# Patient Record
Sex: FEMALE | Race: WHITE | URBAN - METROPOLITAN AREA
[De-identification: names, ages, dates, MRNs, and addresses within clinical notes are randomized per-mention and may not be internally consistent; named-entity substitution may affect disease eponyms.]

---

## 2024-09-10 ENCOUNTER — APPOINTMENT (OUTPATIENT)
Dept: OBSTETRICS AND GYNECOLOGY | Facility: CLINIC | Age: 29
End: 2024-09-10

## 2024-09-10 VITALS
DIASTOLIC BLOOD PRESSURE: 78 MMHG | BODY MASS INDEX: 24.49 KG/M2 | SYSTOLIC BLOOD PRESSURE: 122 MMHG | WEIGHT: 147 LBS | HEIGHT: 65 IN

## 2024-09-10 DIAGNOSIS — Z98.891 HISTORY OF CESAREAN SECTION: ICD-10-CM

## 2024-09-10 DIAGNOSIS — N89.8 VAGINAL IRRITATION: ICD-10-CM

## 2024-09-10 DIAGNOSIS — Z3A.01 LESS THAN 8 WEEKS GESTATION OF PREGNANCY (HHS-HCC): Primary | ICD-10-CM

## 2024-09-10 PROBLEM — E03.9 HYPOTHYROIDISM: Status: ACTIVE | Noted: 2024-09-10

## 2024-09-10 PROCEDURE — 87205 SMEAR GRAM STAIN: CPT

## 2024-09-10 PROCEDURE — 0500F INITIAL PRENATAL CARE VISIT: CPT | Performed by: OBSTETRICS & GYNECOLOGY

## 2024-09-10 RX ORDER — LEVOTHYROXINE SODIUM 50 UG/1
50 TABLET ORAL DAILY
COMMUNITY

## 2024-09-10 ASSESSMENT — EDINBURGH POSTNATAL DEPRESSION SCALE (EPDS)
THE THOUGHT OF HARMING MYSELF HAS OCCURRED TO ME: NEVER
TOTAL SCORE: 4
I HAVE BEEN SO UNHAPPY THAT I HAVE BEEN CRYING: NO, NEVER
THINGS HAVE BEEN GETTING ON TOP OF ME: NO, MOST OF THE TIME I HAVE COPED QUITE WELL
I HAVE BEEN SO UNHAPPY THAT I HAVE HAD DIFFICULTY SLEEPING: NOT AT ALL
I HAVE BEEN ANXIOUS OR WORRIED FOR NO GOOD REASON: HARDLY EVER
I HAVE FELT SAD OR MISERABLE: NO, NOT AT ALL
I HAVE FELT SCARED OR PANICKY FOR NO GOOD REASON: NO, NOT MUCH
I HAVE BEEN ABLE TO LAUGH AND SEE THE FUNNY SIDE OF THINGS: AS MUCH AS I ALWAYS COULD
I HAVE BLAMED MYSELF UNNECESSARILY WHEN THINGS WENT WRONG: NOT VERY OFTEN
I HAVE LOOKED FORWARD WITH ENJOYMENT TO THINGS: AS MUCH AS I EVER DID

## 2024-09-10 NOTE — PROGRESS NOTES
Patient presents for initial OB visit    LMP: 2024  EPDS = 4  C/O: had ureaplasma few months ago,thinks that it is back and wants checked. Hypothyroid.     Tess Alexandra MA II    Initial prenatal visit     29 y.o.  at 4.5 WGA weeks gestational age by sure LMP.  Planned and desired pregnancy.  Still nursing 1.5 year old but periods have been back for a while and have been regular.   Will do USN at next visit.  PMH complicated by hypothyroidism - on synthroid 50mcg daily.  Will recheck TSH with routine labs at next visit.   OB history significant for prior term c/s for breech at 39 weeks.  It was in [place] so unable to get operative report.  It was complicated by a post-op hematoma that was discovered on POD #2 - ultimately the bleeding stopped and she never had a repeat surgery but did receive multiple units of blood.  There is no reason to suspect an incision other than a low transverse.  Pt motivated to have TOLAC.  MFMU 72.7%.  c/o mild fatigue and some breast tenderness. Taking OTC PNV.   Recommended flu vaccine - pt declines.  Recommended updated COVID vaccine. BMI today Body mass index is 24.46 kg/m². Discussed optimal weight gain in pregnancy.  Discussed genetic screening options and carrier screening.  She will think about these.  Not candidate for ASA prophylaxis.  C/o vaginal odor.  Not having noticeable discharge.  Vaginitis swab sent.     Medical Problems       Problem List       Less than 8 weeks gestation of pregnancy ([facility])    Overview Signed 9/10/2024  3:45 PM by Myriam Churchill MD     Desired provider in labor: [x] CNM  [] Physician  [x] Blood Products: [x] Yes, accepts [] No, needs counseling  [x] Initial BMI: 24.76   [] Prenatal Labs:   [] Cervical Cancer Screening up to date  [] Rh status:   [] Genetic Screening:    [] NT US: (11-13 wks)  [] Pregnancy dated by:      [] Anatomy US: (19-20 wks)  [] Federal Sterilization consent signed (if indicated):  [] 1hr GCT at 24-28wks:  []  Rhogam (if indicated):   [] Fetal Surveillance (if indicated):  [] Tdap (27-32 wks, may be given up to 36 wks if initial window missed):   [] RSV (32-36 wks) (Sept. to end ):   [] Flu Vaccine:     [] Breastfeeding:  [] Postpartum Birth control method:   [] GBS at 36 - 37 wks:  [] 39 weeks discussion of IOL vs. Expectant management:  [] Mode of delivery ( anticipated ):           History of  section    Overview Addendum 9/10/2024  3:56 PM by Myriam Churchill MD     - c/s for breech at 39 weeks in [place] - unable to get operative report but no reason to suspect incision other than low transverse   - MFMU 72.7%  - Planning TOLAC          Hypothyroidism    Overview Signed 9/10/2024  3:57 PM by Myriam Churchill MD     - on synthroid 50mcg daily   <> TSH q trimester              Follow up in 4 week(s).

## 2024-09-14 LAB
CLUE CELLS VAG LPF-#/AREA: NORMAL /[LPF]
NUGENT SCORE: 0
YEAST VAG WET PREP-#/AREA: NORMAL

## 2024-09-18 ENCOUNTER — TELEPHONE (OUTPATIENT)
Dept: OBSTETRICS AND GYNECOLOGY | Facility: CLINIC | Age: 29
End: 2024-09-18
Payer: COMMERCIAL

## 2024-09-18 NOTE — TELEPHONE ENCOUNTER
5.6 wk ob called ob line stating that she had bright red bleeding, more than spotting on Saturday night, by Sunday she was only having brown spotting.  Now she is only noticing a grayish tinge to her vaginal discharge when wiping.  Denies cramping or pain.  Patient states she did end up calling the on call Dr over the weekend but didn't go in to the ED for evaluation.    Patient assured that spotting/light bleeding with or without mild cramping is not uncommon in the first part of pregnancy.  This can be from recent intercourse, constipation or even from implantation.  For now, patient encouraged to monitor her spotting/bleeding for now.  She should call office back with heavy bleeding, where she needs to change her pad every 1-2 hours with intense cramping or pain.    Advised to keep next obfu as scheduled on 10/2.  She was assured that I will make Dr Churchill aware of her call.

## 2024-10-02 ENCOUNTER — APPOINTMENT (OUTPATIENT)
Dept: OBSTETRICS AND GYNECOLOGY | Facility: CLINIC | Age: 29
End: 2024-10-02
Payer: COMMERCIAL

## 2024-10-02 VITALS — SYSTOLIC BLOOD PRESSURE: 122 MMHG | WEIGHT: 148.6 LBS | BODY MASS INDEX: 24.73 KG/M2 | DIASTOLIC BLOOD PRESSURE: 71 MMHG

## 2024-10-02 DIAGNOSIS — O21.9 NAUSEA AND VOMITING IN PREGNANCY PRIOR TO 22 WEEKS GESTATION: ICD-10-CM

## 2024-10-02 DIAGNOSIS — E03.9 HYPOTHYROIDISM, UNSPECIFIED TYPE: ICD-10-CM

## 2024-10-02 DIAGNOSIS — Z34.90 PRENATAL CARE, ANTEPARTUM (HHS-HCC): ICD-10-CM

## 2024-10-02 DIAGNOSIS — Z3A.01 LESS THAN 8 WEEKS GESTATION OF PREGNANCY (HHS-HCC): Primary | ICD-10-CM

## 2024-10-02 DIAGNOSIS — Z98.891 HISTORY OF CESAREAN SECTION: ICD-10-CM

## 2024-10-02 PROCEDURE — 0501F PRENATAL FLOW SHEET: CPT | Performed by: OBSTETRICS & GYNECOLOGY

## 2024-10-02 PROCEDURE — 87086 URINE CULTURE/COLONY COUNT: CPT

## 2024-10-02 PROCEDURE — 87491 CHLMYD TRACH DNA AMP PROBE: CPT

## 2024-10-02 PROCEDURE — 87591 N.GONORRHOEAE DNA AMP PROB: CPT

## 2024-10-02 RX ORDER — ONDANSETRON 4 MG/1
4 TABLET, FILM COATED ORAL EVERY 8 HOURS PRN
Qty: 20 TABLET | Refills: 1 | Status: SHIPPED | OUTPATIENT
Start: 2024-10-02 | End: 2024-11-01

## 2024-10-02 NOTE — PROGRESS NOTES
Patient presents for OBFU  Pap, GC/CT & urine culture sent today  Flu: Declines   C/O: nervous, had bleeding on     Tess Alexandra MA II    Routine prenatal visit   Pt worried because she had 1 day of bright red bleeding that turned to spotting.  Has not happened again since.  USN done today - tejeda IUP seen with CR[facility] c/w LMP dating.  Cardiac activity noted.  Pt would like carrier screening - sent today with routine labs.  Declines NIPS.  13 week USN discussed and she will call to schedule.  Declines flu/COVID vaccination.   Repeat TSH sent as well.  Has had some nausea- would like zofran prescription to have on-hand as needed.  Prescription sent.     Medical Problems       Problem List       Less than 8 weeks gestation of pregnancy ([facility])    Overview Addendum 10/2/2024 11:28 AM by Myriam Churchill MD     Desired provider in labor: [x] CNM  [] Physician  [x] Blood Products: [x] Yes, accepts [] No, needs counseling  [x] Initial BMI: 24.76   [] Prenatal Labs:   [] Cervical Cancer Screening up to date<> record release from [facility] signed   [] Rh status:   [] Genetic Screening:  Declines   [] NT US: (11-13 wks)  [x] Pregnancy dated by:  LMP c/w 7 week in-office USN     [] Anatomy US: (19-20 wks)  [] Federal Sterilization consent signed (if indicated):  [] 1hr GCT at 24-28wks:  [] Rhogam (if indicated):   [] Fetal Surveillance (if indicated):  [] Tdap (27-32 wks, may be given up to 36 wks if initial window missed):   [] RSV (32-36 wks) (Sept. to end of ):   [x] Flu Vaccine: Declines   [x] Updated COVID vaccine recommended     [] Breastfeeding:  [] Postpartum Birth control method:   [] GBS at 36 - 37 wks:  [] 39 weeks discussion of IOL vs. Expectant management:  [] Mode of delivery ( anticipated ):           History of  section    Overview Addendum 10/2/2024 11:29 AM by Myriam Churchill MD     - c/s for breech at 39 weeks in [place] - unable to get operative report but no reason to suspect  incision other than low transverse   - MFMU 72.7%  - Planning TOLAC   <> give pt TOLAC counseling sheet to review          Hypothyroidism    Overview Signed 9/10/2024  3:57 PM by Myriam Churchill MD     - on synthroid 50mcg daily   <> TSH q trimester              Follow up in 4 week(s).

## 2024-10-03 LAB
BACTERIA UR CULT: NORMAL
C TRACH RRNA SPEC QL NAA+PROBE: NEGATIVE
N GONORRHOEA DNA SPEC QL PROBE+SIG AMP: NEGATIVE

## 2024-10-21 ENCOUNTER — LAB (OUTPATIENT)
Dept: LAB | Facility: LAB | Age: 29
End: 2024-10-21
Payer: COMMERCIAL

## 2024-10-21 DIAGNOSIS — Z34.90 PRENATAL CARE, ANTEPARTUM (HHS-HCC): ICD-10-CM

## 2024-10-21 LAB
ABO GROUP (TYPE) IN BLOOD: NORMAL
ANTIBODY SCREEN: NORMAL
ERYTHROCYTE [DISTWIDTH] IN BLOOD BY AUTOMATED COUNT: 12.8 % (ref 11.5–14.5)
EST. AVERAGE GLUCOSE BLD GHB EST-MCNC: 91 MG/DL
HBA1C MFR BLD: 4.8 %
HBV SURFACE AG SERPL QL IA: NONREACTIVE
HCT VFR BLD AUTO: 41.7 % (ref 36–46)
HCV AB SER QL: NONREACTIVE
HGB BLD-MCNC: 13.3 G/DL (ref 12–16)
HIV 1+2 AB+HIV1 P24 AG SERPL QL IA: NONREACTIVE
MCH RBC QN AUTO: 27.3 PG (ref 26–34)
MCHC RBC AUTO-ENTMCNC: 31.9 G/DL (ref 32–36)
MCV RBC AUTO: 86 FL (ref 80–100)
NRBC BLD-RTO: 0 /100 WBCS (ref 0–0)
PLATELET # BLD AUTO: 150 X10*3/UL (ref 150–450)
RBC # BLD AUTO: 4.88 X10*6/UL (ref 4–5.2)
REFLEX ADDED, ANEMIA PANEL: NORMAL
RH FACTOR (ANTIGEN D): NORMAL
RUBV IGG SERPL IA-ACNC: 1.5 IA
RUBV IGG SERPL QL IA: POSITIVE
TREPONEMA PALLIDUM IGG+IGM AB [PRESENCE] IN SERUM OR PLASMA BY IMMUNOASSAY: NONREACTIVE
TSH SERPL-ACNC: 2.12 MIU/L (ref 0.44–3.98)
WBC # BLD AUTO: 7.4 X10*3/UL (ref 4.4–11.3)

## 2024-10-21 PROCEDURE — 86901 BLOOD TYPING SEROLOGIC RH(D): CPT

## 2024-10-21 PROCEDURE — G0452 MOLECULAR PATHOLOGY INTERPR: HCPCS | Performed by: PATHOLOGY

## 2024-10-21 PROCEDURE — 86900 BLOOD TYPING SEROLOGIC ABO: CPT

## 2024-10-21 PROCEDURE — 86780 TREPONEMA PALLIDUM: CPT

## 2024-10-21 PROCEDURE — 86803 HEPATITIS C AB TEST: CPT

## 2024-10-21 PROCEDURE — 81220 CFTR GENE COM VARIANTS: CPT

## 2024-10-21 PROCEDURE — 87340 HEPATITIS B SURFACE AG IA: CPT

## 2024-10-21 PROCEDURE — 81329 SMN1 GENE DOS/DELETION ALYS: CPT

## 2024-10-21 PROCEDURE — 86850 RBC ANTIBODY SCREEN: CPT

## 2024-10-21 PROCEDURE — 86317 IMMUNOASSAY INFECTIOUS AGENT: CPT

## 2024-10-21 PROCEDURE — 36415 COLL VENOUS BLD VENIPUNCTURE: CPT

## 2024-10-21 PROCEDURE — 83036 HEMOGLOBIN GLYCOSYLATED A1C: CPT

## 2024-10-21 PROCEDURE — 85027 COMPLETE CBC AUTOMATED: CPT

## 2024-10-21 PROCEDURE — 84443 ASSAY THYROID STIM HORMONE: CPT

## 2024-10-21 PROCEDURE — 81243 FMR1 GEN ALY DETC ABNL ALLEL: CPT

## 2024-10-21 PROCEDURE — 87389 HIV-1 AG W/HIV-1&-2 AB AG IA: CPT

## 2024-10-24 LAB
ELECTRONICALLY SIGNED BY: NORMAL
SMA RESULT: NORMAL

## 2024-10-25 LAB
CFTR MUT ANL BLD/T: NORMAL
ELECTRONICALLY SIGNED BY: NORMAL

## 2024-10-29 LAB
ELECTRONICALLY SIGNED BY: NORMAL
FRAGILE X INTERPRETATION: NORMAL
FRAGILE X RESULT: NORMAL

## 2024-11-06 ENCOUNTER — HOSPITAL ENCOUNTER (OUTPATIENT)
Dept: RADIOLOGY | Facility: CLINIC | Age: 29
Discharge: HOME | End: 2024-11-06
Payer: COMMERCIAL

## 2024-11-06 DIAGNOSIS — O26.891 OTHER SPECIFIED PREGNANCY RELATED CONDITIONS, FIRST TRIMESTER (HHS-HCC): ICD-10-CM

## 2024-11-06 DIAGNOSIS — Z34.90 PRENATAL CARE, ANTEPARTUM (HHS-HCC): ICD-10-CM

## 2024-11-06 PROCEDURE — 76801 OB US < 14 WKS SINGLE FETUS: CPT | Performed by: OBSTETRICS & GYNECOLOGY

## 2024-11-06 PROCEDURE — 76801 OB US < 14 WKS SINGLE FETUS: CPT

## 2024-11-07 ENCOUNTER — APPOINTMENT (OUTPATIENT)
Dept: OBSTETRICS AND GYNECOLOGY | Facility: CLINIC | Age: 29
End: 2024-11-07
Payer: COMMERCIAL

## 2024-11-07 VITALS — BODY MASS INDEX: 25.63 KG/M2 | WEIGHT: 154 LBS | SYSTOLIC BLOOD PRESSURE: 118 MMHG | DIASTOLIC BLOOD PRESSURE: 64 MMHG

## 2024-11-07 DIAGNOSIS — Z3A.13 13 WEEKS GESTATION OF PREGNANCY (HHS-HCC): Primary | ICD-10-CM

## 2024-11-07 PROCEDURE — 0501F PRENATAL FLOW SHEET: CPT | Performed by: OBSTETRICS & GYNECOLOGY

## 2024-11-07 NOTE — PROGRESS NOTES
Doing well. No complaints.   FNT wnl . OB labs wnl.   We discussed genetic screening in pregnancy.  Patient agrees and blood work drawn for cfDNA   Lorraine Pastor MD

## 2024-12-02 ENCOUNTER — APPOINTMENT (OUTPATIENT)
Dept: OBSTETRICS AND GYNECOLOGY | Facility: CLINIC | Age: 29
End: 2024-12-02
Payer: COMMERCIAL

## 2024-12-02 VITALS — SYSTOLIC BLOOD PRESSURE: 122 MMHG | WEIGHT: 160.6 LBS | BODY MASS INDEX: 26.73 KG/M2 | DIASTOLIC BLOOD PRESSURE: 72 MMHG

## 2024-12-02 DIAGNOSIS — Z3A.26 26 WEEKS GESTATION OF PREGNANCY (HHS-HCC): Primary | ICD-10-CM

## 2024-12-02 PROBLEM — Z3A.16 16 WEEKS GESTATION OF PREGNANCY (HHS-HCC): Status: ACTIVE | Noted: 2024-09-10

## 2024-12-02 PROCEDURE — 0501F PRENATAL FLOW SHEET: CPT | Performed by: OBSTETRICS & GYNECOLOGY

## 2024-12-19 ENCOUNTER — HOSPITAL ENCOUNTER (OUTPATIENT)
Dept: RADIOLOGY | Facility: CLINIC | Age: 29
Discharge: HOME | End: 2024-12-19
Payer: COMMERCIAL

## 2024-12-19 ENCOUNTER — APPOINTMENT (OUTPATIENT)
Dept: RADIOLOGY | Facility: CLINIC | Age: 29
End: 2024-12-19
Payer: COMMERCIAL

## 2024-12-19 DIAGNOSIS — Z34.90 PRENATAL CARE, ANTEPARTUM (HHS-HCC): ICD-10-CM

## 2024-12-19 PROCEDURE — 76817 TRANSVAGINAL US OBSTETRIC: CPT

## 2024-12-19 PROCEDURE — 76811 OB US DETAILED SNGL FETUS: CPT

## 2024-12-21 PROBLEM — O44.40 LOW-LYING PLACENTA (HHS-HCC): Status: ACTIVE | Noted: 2024-12-21

## 2024-12-31 ENCOUNTER — APPOINTMENT (OUTPATIENT)
Dept: OBSTETRICS AND GYNECOLOGY | Facility: CLINIC | Age: 29
End: 2024-12-31
Payer: COMMERCIAL

## 2024-12-31 VITALS — SYSTOLIC BLOOD PRESSURE: 129 MMHG | WEIGHT: 170 LBS | BODY MASS INDEX: 28.29 KG/M2 | DIASTOLIC BLOOD PRESSURE: 83 MMHG

## 2024-12-31 DIAGNOSIS — Z98.891 HISTORY OF CESAREAN SECTION: ICD-10-CM

## 2024-12-31 DIAGNOSIS — Z3A.20 20 WEEKS GESTATION OF PREGNANCY (HHS-HCC): Primary | ICD-10-CM

## 2024-12-31 PROCEDURE — 0501F PRENATAL FLOW SHEET: CPT | Performed by: OBSTETRICS & GYNECOLOGY

## 2024-12-31 NOTE — PROGRESS NOTES
Patient presents for OBFU  Glucose and 28 week folder given or n/v. Patient understands that she is to NOT drink the glucose if she is rescheduled to a different location.  C/O: might be traveling to [place] to see friend with stage 4 cancer. Travel/Chemo questions. Letter for airline. Pain on incision sight.     Tess Alexandra MA II    Routine prenatal visit     Subjective    HPI:  Doing well overall. Feeling normal FM now.  Discussed risks of TOLAC and given information sheet for this. Planning to travel to [place] to see a friend who is dying.  Will try to do glucola by 28 weeks depending on when she gets home.    Objective    Vital Signs  /83   Wt 77.1 kg (170 lb)   LMP 2024 (Exact Date)   BMI 28.29 kg/m²     Melissa German is a 29 y.o. yo  at 20w5d here for the following concerns which we addressed today:     Medical Problems       Problem List       20 weeks gestation of pregnancy ([facility])    Overview Addendum 2024 11:36 AM by Myriam Churchill MD     Desired provider in labor: [x] CNM  [] Physician  [x] Blood Products: [x] Yes, accepts [] No, needs counseling  [x] Initial BMI: 24.76   [x] Prenatal Labs:   [x] Cervical Cancer Screening up to date: 2024 - WNL (records scanned in)  [x] Rh status: O pos  [x] Genetic Screening:  RR NIPS, it's a BOY   [x] NT US: (11-13 wks): WNL  [x] Pregnancy dated by:  LMP c/w 7 week in-office USN     [x] Anatomy US: (19-20 wks): WNL other than low-lying placenta   [] Federal Sterilization consent signed (if indicated):  [] 1hr GCT at 24-28wks:  [] Fetal Surveillance (if indicated):  [] Tdap (27-32 wks, may be given up to 36 wks if initial window missed):   [] RSV (32-36 wks) (Sept. to end of ):   [x] Flu Vaccine: Declines   [x] Updated COVID vaccine recommended     [] Breastfeeding:  [] Postpartum Birth control method:   [] GBS at 36 - 37 wks:  [] 39 weeks discussion of IOL vs. Expectant management:  [] Mode of delivery ( anticipated ):            History of  section    Overview Addendum 2024  2:06 PM by Myriam Churchill MD     - c/s for breech at 39 weeks in [place] - unable to get operative report but no reason to suspect incision other than low transverse   - MFMU 72.7%  - Planning TOLAC            Hypothyroidism    Overview Signed 9/10/2024  3:57 PM by Myriam Churchill MD     - on synthroid 50mcg daily   <> TSH q trimester          Low-lying placenta (HHS-HCC)    Overview Signed 2024  6:27 AM by Myriam Churchill MD     - 1.2 cm from os on anatomy USN   <> f/u USN 30 weeks              Follow up in 4 week(s).

## 2025-01-28 ENCOUNTER — APPOINTMENT (OUTPATIENT)
Dept: OBSTETRICS AND GYNECOLOGY | Facility: CLINIC | Age: 30
End: 2025-01-28
Payer: COMMERCIAL

## 2025-02-14 ENCOUNTER — APPOINTMENT (OUTPATIENT)
Dept: OBSTETRICS AND GYNECOLOGY | Facility: CLINIC | Age: 30
End: 2025-02-14
Payer: COMMERCIAL

## 2025-02-25 ENCOUNTER — APPOINTMENT (OUTPATIENT)
Dept: OBSTETRICS AND GYNECOLOGY | Facility: CLINIC | Age: 30
End: 2025-02-25
Payer: COMMERCIAL

## 2025-02-25 VITALS — SYSTOLIC BLOOD PRESSURE: 122 MMHG | BODY MASS INDEX: 30.35 KG/M2 | WEIGHT: 182.4 LBS | DIASTOLIC BLOOD PRESSURE: 77 MMHG

## 2025-02-25 DIAGNOSIS — E03.9 HYPOTHYROIDISM, UNSPECIFIED TYPE: ICD-10-CM

## 2025-02-25 DIAGNOSIS — Z13.1 SCREENING FOR DIABETES MELLITUS (DM): ICD-10-CM

## 2025-02-25 DIAGNOSIS — Z3A.28 28 WEEKS GESTATION OF PREGNANCY (HHS-HCC): Primary | ICD-10-CM

## 2025-02-25 DIAGNOSIS — O44.40 LOW-LYING PLACENTA (HHS-HCC): ICD-10-CM

## 2025-02-25 DIAGNOSIS — Z98.891 HISTORY OF CESAREAN SECTION: ICD-10-CM

## 2025-02-25 LAB
ERYTHROCYTE [DISTWIDTH] IN BLOOD BY AUTOMATED COUNT: 13.1 % (ref 11.5–14.5)
HCT VFR BLD AUTO: 38.9 % (ref 36–46)
HGB BLD-MCNC: 12.7 G/DL (ref 12–16)
MCH RBC QN AUTO: 28.9 PG (ref 26–34)
MCHC RBC AUTO-ENTMCNC: 32.6 G/DL (ref 32–36)
MCV RBC AUTO: 88 FL (ref 80–100)
NRBC BLD-RTO: 0 /100 WBCS (ref 0–0)
PLATELET # BLD AUTO: 98 X10*3/UL (ref 150–450)
RBC # BLD AUTO: 4.4 X10*6/UL (ref 4–5.2)
WBC # BLD AUTO: 9 X10*3/UL (ref 4.4–11.3)

## 2025-02-25 PROCEDURE — 85027 COMPLETE CBC AUTOMATED: CPT

## 2025-02-25 PROCEDURE — 0501F PRENATAL FLOW SHEET: CPT | Performed by: OBSTETRICS & GYNECOLOGY

## 2025-02-25 ASSESSMENT — EDINBURGH POSTNATAL DEPRESSION SCALE (EPDS)
THE THOUGHT OF HARMING MYSELF HAS OCCURRED TO ME: NEVER
I HAVE BEEN SO UNHAPPY THAT I HAVE BEEN CRYING: ONLY OCCASIONALLY
THINGS HAVE BEEN GETTING ON TOP OF ME: NO, MOST OF THE TIME I HAVE COPED QUITE WELL
TOTAL SCORE: 11
I HAVE BEEN ANXIOUS OR WORRIED FOR NO GOOD REASON: YES, SOMETIMES
I HAVE FELT SAD OR MISERABLE: YES, QUITE OFTEN
I HAVE BLAMED MYSELF UNNECESSARILY WHEN THINGS WENT WRONG: YES, SOME OF THE TIME
I HAVE BEEN SO UNHAPPY THAT I HAVE HAD DIFFICULTY SLEEPING: NOT VERY OFTEN
I HAVE LOOKED FORWARD WITH ENJOYMENT TO THINGS: RATHER LESS THAN I USED TO
I HAVE BEEN ABLE TO LAUGH AND SEE THE FUNNY SIDE OF THINGS: AS MUCH AS I ALWAYS COULD
I HAVE FELT SCARED OR PANICKY FOR NO GOOD REASON: NO, NOT MUCH

## 2025-02-25 NOTE — PROGRESS NOTES
Patient presents for OBFU  GTT/CBC/Syph/TSH today  EPDS = 11  Tdap: Discuss   C/O: None    Tess Alexandra MA II    Routine prenatal visit     Subjective    HPI:  No complaints today - feeling well overall.  Glucola/CBC/syphilis screen done today.  Discussed Tdap.  Pt unsure.  Given [facility] patient information sheet - will ask again next visit.     Objective    Vital Signs  /77   Wt 82.7 kg (182 lb 6.4 oz)   LMP 2024 (Exact Date)   BMI 30.35 kg/m²     Melissa German is a 29 y.o. yo  at 28w6d here for the following concerns which we addressed today:     Medical Problems       Problem List       28 weeks gestation of pregnancy ([facility])    Overview Addendum 2025 10:51 AM by Myriam Churchill MD     Desired provider in labor: [x] CNM  [] Physician  [x] Blood Products: [x] Yes, accepts [] No, needs counseling  [x] Initial BMI: 24.76   [x] Prenatal Labs:   [x] Cervical Cancer Screening up to date: 2024 - WNL (records scanned in)  [x] Rh status: O pos  [x] Genetic Screening:  RR NIPS, it's a BOY   [x] NT US: (11-13 wks): WNL  [x] Pregnancy dated by:  LMP c/w 7 week in-office USN     [x] Anatomy US: (19-20 wks): WNL other than low-lying placenta   [] Federal Sterilization consent signed (if indicated):  [] 1hr GCT at 24-28wks: <> done    [] Fetal Surveillance (if indicated):  [] Tdap: <> wants to think about - discuss again next visit   [x] Flu Vaccine: Declines   [x] Updated COVID vaccine recommended     [] Breastfeeding:  [] Postpartum Birth control method:   [] GBS at 36 - 37 wks:  [] 39 weeks discussion of IOL vs. Expectant management:  [] Mode of delivery ( anticipated ):           History of  section    Overview Addendum 2024  2:06 PM by Myriam Churchill MD     - c/s for breech at 39 weeks in [place] - unable to get operative report but no reason to suspect incision other than low transverse   - MFMU 72.7%  - Planning TOLAC            Hypothyroidism    Overview Signed  9/10/2024  3:57 PM by Myriam Churchill MD     - on synthroid 50mcg daily   <> TSH q trimester          Low-lying placenta (HHS-HCC)    Overview Addendum 2/26/2025 10:52 AM by Myriam Churchill MD     - 1.2 cm from os on anatomy USN   <> f/u USN 30 weeks scheduled 3/6              Follow up in 2 week(s).

## 2025-02-26 LAB — REFLEX ADDED, ANEMIA PANEL: NORMAL

## 2025-02-27 LAB — TSH SERPL-ACNC: 1.49 MIU/L

## 2025-03-03 ENCOUNTER — TELEPHONE (OUTPATIENT)
Dept: OBSTETRICS AND GYNECOLOGY | Facility: CLINIC | Age: 30
End: 2025-03-03
Payer: COMMERCIAL

## 2025-03-03 NOTE — TELEPHONE ENCOUNTER
Patient called the OB line and spoke with me. Patient is 29w 4d. Patient was calling in regards to her recent blood work results. I advised her that only half of her results were back and we're still waiting on the other half. Once the other results are back, one of us will call her or send her a My Chart message.    Patient had her GTT/CBC drawn 2/25/2025. Still pending results.

## 2025-03-05 PROBLEM — D69.6 THROMBOCYTOPENIA (CMS-HCC): Status: ACTIVE | Noted: 2025-03-05

## 2025-03-06 ENCOUNTER — HOSPITAL ENCOUNTER (OUTPATIENT)
Dept: RADIOLOGY | Facility: CLINIC | Age: 30
Discharge: HOME | End: 2025-03-06
Payer: COMMERCIAL

## 2025-03-06 DIAGNOSIS — Z34.90 PRENATAL CARE, ANTEPARTUM (HHS-HCC): ICD-10-CM

## 2025-03-06 PROCEDURE — 76817 TRANSVAGINAL US OBSTETRIC: CPT

## 2025-03-06 PROCEDURE — 76816 OB US FOLLOW-UP PER FETUS: CPT

## 2025-03-11 ENCOUNTER — APPOINTMENT (OUTPATIENT)
Dept: OBSTETRICS AND GYNECOLOGY | Facility: CLINIC | Age: 30
End: 2025-03-11
Payer: COMMERCIAL

## 2025-03-11 VITALS — SYSTOLIC BLOOD PRESSURE: 117 MMHG | BODY MASS INDEX: 29.85 KG/M2 | DIASTOLIC BLOOD PRESSURE: 77 MMHG | WEIGHT: 179.4 LBS

## 2025-03-11 DIAGNOSIS — O44.40 LOW-LYING PLACENTA (HHS-HCC): ICD-10-CM

## 2025-03-11 DIAGNOSIS — D69.6 THROMBOCYTOPENIA (CMS-HCC): ICD-10-CM

## 2025-03-11 DIAGNOSIS — Z3A.30 30 WEEKS GESTATION OF PREGNANCY (HHS-HCC): ICD-10-CM

## 2025-03-11 DIAGNOSIS — Z98.891 HISTORY OF CESAREAN SECTION: ICD-10-CM

## 2025-03-11 DIAGNOSIS — Z13.1 SCREENING FOR DIABETES MELLITUS (DM): ICD-10-CM

## 2025-03-11 DIAGNOSIS — D69.6 THROMBOCYTOPENIA AFFECTING PREGNANCY (MULTI): Primary | ICD-10-CM

## 2025-03-11 DIAGNOSIS — E03.9 HYPOTHYROIDISM, UNSPECIFIED TYPE: ICD-10-CM

## 2025-03-11 DIAGNOSIS — O99.119 THROMBOCYTOPENIA AFFECTING PREGNANCY (MULTI): Primary | ICD-10-CM

## 2025-03-11 PROCEDURE — 0501F PRENATAL FLOW SHEET: CPT | Performed by: OBSTETRICS & GYNECOLOGY

## 2025-03-11 NOTE — PROGRESS NOTES
Patient presents for OBFU  Tdap: Declines   C/O: BH's, pressure & fatigue. Hands are discolored x 2 days.     Tess Alexandra MA II    Routine prenatal visit     Subjective    HPI:  Feeling OK overall.  Discussed Tdap - pt declines.  She is concerned about vaccines because her  had a reaction after getting a vaccine when he was a baby (his brother also had a similar reaction) that led him to be hospitalized.  He is unclear what vaccine it was or what the reaction was and hs parents are no longer living, so he is unable to get more information about this.  Due to this, she is unsure what vaccines she plans to get for her child.     Reviewed labs from last visit - her glucola and syphilis screen did not result - unclear why -will reach out to [facility] to discuss.  She did have a CBC which showed thrombocytopenia with platelets 98.  Will repeat CBC. TSH appropriate -continue current dose of synthroid.     Objective    Vital Signs  /77   Wt 81.4 kg (179 lb 6.4 oz)   LMP 2024 (Exact Date)   BMI 29.85 kg/m²     Melissa German is a 29 y.o. yo  at 30w6d here for the following concerns which we addressed today:     Medical Problems       Problem List       30 weeks gestation of pregnancy ([facility])    Overview Addendum 3/12/2025  3:58 PM by Myriam Churchill MD     Desired provider in labor: [x] CNM  [] Physician  [x] Blood Products: [x] Yes, accepts [] No, needs counseling  [x] Initial BMI: 24.76   [x] Prenatal Labs:   [x] Cervical Cancer Screening up to date: 2024 - WNL (records scanned in)  [x] Rh status: O pos  [x] Genetic Screening:  RR NIPS, it's a BOY   [x] NT US: (11-13 wks): WNL  [x] Pregnancy dated by:  LMP c/w 7 week in-office USN     [x] Anatomy US: (19-20 wks): WNL other than low-lying placenta   [] Federal Sterilization consent signed (if indicated):  [] 1hr GCT at 24-28wks: <> done  --> no results available   [] Fetal Surveillance (if indicated):  [x] Tdap: Declines   [x] Flu  Vaccine: Declines   [x] Updated COVID vaccine recommended     [] Breastfeeding:  [] Postpartum Birth control method:   [] GBS at 36 - 37 wks:  [] 39 weeks discussion of IOL vs. Expectant management:  [] Mode of delivery ( anticipated ):           History of  section    Overview Addendum 2024  2:06 PM by Myriam Churchill MD     - c/s for breech at 39 weeks in [place] - unable to get operative report but no reason to suspect incision other than low transverse   - MFMU 72.7%  - Planning TOLAC            Hypothyroidism    Overview Signed 9/10/2024  3:57 PM by Myriam Churchill MD     - on synthroid 50mcg daily   <> TSH q trimester          Low-lying placenta (HHS-HCC)    Overview Addendum 3/7/2025 12:13 PM by Myriam Churchill MD     - 1.2 cm from os on anatomy USN   --> RESOLVED at 30 week USN            Thrombocytopenia (CMS-HCC)    Overview Signed 3/5/2025  4:03 PM by Myriam Churchill MD     - platelets 98 with glucola   <> repeat CBC              Follow up in 2 week(s).

## 2025-03-12 DIAGNOSIS — Z13.1 SCREENING FOR DIABETES MELLITUS (DM): ICD-10-CM

## 2025-03-12 PROBLEM — Z3A.30 30 WEEKS GESTATION OF PREGNANCY (HHS-HCC): Status: ACTIVE | Noted: 2024-09-10

## 2025-03-25 ENCOUNTER — APPOINTMENT (OUTPATIENT)
Dept: OBSTETRICS AND GYNECOLOGY | Facility: CLINIC | Age: 30
End: 2025-03-25
Payer: COMMERCIAL

## 2025-03-25 VITALS — WEIGHT: 182.6 LBS | BODY MASS INDEX: 30.39 KG/M2 | SYSTOLIC BLOOD PRESSURE: 125 MMHG | DIASTOLIC BLOOD PRESSURE: 79 MMHG

## 2025-03-25 DIAGNOSIS — O44.40 LOW-LYING PLACENTA (HHS-HCC): ICD-10-CM

## 2025-03-25 DIAGNOSIS — E03.9 HYPOTHYROIDISM, UNSPECIFIED TYPE: ICD-10-CM

## 2025-03-25 DIAGNOSIS — Z98.891 HISTORY OF CESAREAN SECTION: ICD-10-CM

## 2025-03-25 DIAGNOSIS — Z3A.32 32 WEEKS GESTATION OF PREGNANCY (HHS-HCC): Primary | ICD-10-CM

## 2025-03-25 DIAGNOSIS — Z13.1 SCREENING FOR DIABETES MELLITUS (DM): ICD-10-CM

## 2025-03-25 DIAGNOSIS — O23.43 URINARY TRACT INFECTION IN MOTHER DURING THIRD TRIMESTER OF PREGNANCY (HHS-HCC): ICD-10-CM

## 2025-03-25 DIAGNOSIS — D69.6 THROMBOCYTOPENIA (CMS-HCC): ICD-10-CM

## 2025-03-25 LAB
POC APPEARANCE, URINE: ABNORMAL
POC BILIRUBIN, URINE: NEGATIVE
POC BLOOD, URINE: ABNORMAL
POC COLOR, URINE: ABNORMAL
POC GLUCOSE, URINE: NEGATIVE MG/DL
POC KETONES, URINE: NEGATIVE MG/DL
POC LEUKOCYTES, URINE: ABNORMAL
POC NITRITE,URINE: NEGATIVE
POC PH, URINE: 6.5 PH
POC PROTEIN, URINE: NEGATIVE MG/DL
POC SPECIFIC GRAVITY, URINE: 1.01
POC UROBILINOGEN, URINE: 0.2 EU/DL

## 2025-03-25 PROCEDURE — 0501F PRENATAL FLOW SHEET: CPT | Performed by: OBSTETRICS & GYNECOLOGY

## 2025-03-25 PROCEDURE — 81003 URINALYSIS AUTO W/O SCOPE: CPT | Performed by: OBSTETRICS & GYNECOLOGY

## 2025-03-25 NOTE — PROGRESS NOTES
"Patient presents for OBFU  CBC/Glucose & Syph today  C/O: Can not hear out of right ear. UTI?     Tess Alexandra MA II    Routine prenatal visit     Subjective    HPI:  Pt did glucola again today as it was not run by [facility] when she drank it last time.  Also sent syphilis screen and repeated CBC given platelets of 98.  She has ordered her breast pump.  She feels occasional \"twinges\" of pain at her urethra and wonders whether she may have a UTI.  UA in office trace leukocyte esterase and blood -urine culture sent - will treat based on results.      Objective    Vital Signs  /79   Wt 82.8 kg (182 lb 9.6 oz)   LMP 2024 (Exact Date)   BMI 30.39 kg/m²     Melissa German is a 29 y.o. yo  at 32w5d here for the following concerns which we addressed today:     Medical Problems       Problem List       32 weeks gestation of pregnancy ([facility])    Overview Addendum 3/25/2025 11:58 AM by Myriam Churchill MD     Desired provider in labor: [x] CNM  [] Physician  [x] Blood Products: [x] Yes, accepts [] No, needs counseling  [x] Initial BMI: 24.76   [x] Prenatal Labs:   [x] Cervical Cancer Screening up to date: 2024 - WNL (records scanned in)  [x] Rh status: O pos  [x] Genetic Screening:  RR NIPS, it's a BOY   [x] NT US: (11-13 wks): WNL  [x] Pregnancy dated by:  LMP c/w 7 week in-office USN     [x] Anatomy US: (19-20 wks): WNL other than low-lying placenta   [] Federal Sterilization consent signed (if indicated):  [] 1hr GCT at 24-28wks: <> repeat glucola collected today as was not run by lab at last draw   [] Fetal Surveillance (if indicated):  [x] Tdap: Declines   [x] Flu Vaccine: Declines   [x] Updated COVID vaccine recommended     [x] Breastfeeding: plans to breast feed - has pump ordered   [] Postpartum Birth control method:   [] GBS at 36 - 37 wks:  [] 39 weeks discussion of IOL vs. Expectant management:  [] Mode of delivery ( anticipated ): TOLAC            History of  section    Overview " Addendum 12/31/2024  2:06 PM by Myriam Churchill MD     - c/s for breech at 39 weeks in [place] - unable to get operative report but no reason to suspect incision other than low transverse   - MFMU 72.7%  - Planning TOLAC            Hypothyroidism    Overview Addendum 3/25/2025 11:58 AM by Myriam Churchill MD     - on synthroid 50mcg daily   <> TSH approx 34 weeks          Low-lying placenta (HHS-HCC)    Overview Addendum 3/7/2025 12:13 PM by Myriam Churchill MD     - 1.2 cm from os on anatomy USN   --> RESOLVED at 30 week USN            Thrombocytopenia (CMS-HCC)    Overview Addendum 3/25/2025 11:58 AM by Myriam Churchill MD     - platelets 98 with glucola   <> repeat CBC sent 3/25              Follow up in 2 week(s).

## 2025-03-26 DIAGNOSIS — Z3A.33 33 WEEKS GESTATION OF PREGNANCY (HHS-HCC): ICD-10-CM

## 2025-03-26 DIAGNOSIS — D69.6 THROMBOCYTOPENIA (CMS-HCC): ICD-10-CM

## 2025-03-28 LAB
BACTERIA UR CULT: ABNORMAL
ERYTHROCYTE [DISTWIDTH] IN BLOOD BY AUTOMATED COUNT: 11.8 % (ref 11–15)
GLUCOSE 1H P 50 G GLC PO SERPL-MCNC: 100 MG/DL
HCT VFR BLD AUTO: 41.3 % (ref 35–45)
HGB BLD-MCNC: 13.6 G/DL (ref 11.7–15.5)
MCH RBC QN AUTO: 28 PG (ref 27–33)
MCHC RBC AUTO-ENTMCNC: 32.9 G/DL (ref 32–36)
MCV RBC AUTO: 85.2 FL (ref 80–100)
PLATELET # BLD AUTO: 93 THOUSAND/UL (ref 140–400)
PMV BLD REES-ECKER: 12.4 FL (ref 7.5–12.5)
RBC # BLD AUTO: 4.85 MILLION/UL (ref 3.8–5.1)
T PALLIDUM AB SER QL IA: NORMAL
WBC # BLD AUTO: 8.9 THOUSAND/UL (ref 3.8–10.8)

## 2025-03-31 LAB
BACTERIA UR CULT: ABNORMAL
ERYTHROCYTE [DISTWIDTH] IN BLOOD BY AUTOMATED COUNT: 11.8 % (ref 11–15)
GLUCOSE 1H P 50 G GLC PO SERPL-MCNC: 100 MG/DL
HCT VFR BLD AUTO: 41.3 % (ref 35–45)
HGB BLD-MCNC: 13.6 G/DL (ref 11.7–15.5)
MCH RBC QN AUTO: 28 PG (ref 27–33)
MCHC RBC AUTO-ENTMCNC: 32.9 G/DL (ref 32–36)
MCV RBC AUTO: 85.2 FL (ref 80–100)
PLATELET # BLD AUTO: 93 THOUSAND/UL (ref 140–400)
PMV BLD REES-ECKER: 12.4 FL (ref 7.5–12.5)
RBC # BLD AUTO: 4.85 MILLION/UL (ref 3.8–5.1)
T PALLIDUM AB SER QL IA: NEGATIVE
WBC # BLD AUTO: 8.9 THOUSAND/UL (ref 3.8–10.8)

## 2025-04-02 ENCOUNTER — HOSPITAL ENCOUNTER (OUTPATIENT)
Facility: HOSPITAL | Age: 30
Discharge: HOME | End: 2025-04-02
Attending: OBSTETRICS & GYNECOLOGY | Admitting: OBSTETRICS & GYNECOLOGY
Payer: COMMERCIAL

## 2025-04-02 ENCOUNTER — HOSPITAL ENCOUNTER (OUTPATIENT)
Facility: HOSPITAL | Age: 30
End: 2025-04-02
Attending: OBSTETRICS & GYNECOLOGY | Admitting: OBSTETRICS & GYNECOLOGY
Payer: COMMERCIAL

## 2025-04-02 VITALS
HEART RATE: 85 BPM | HEIGHT: 65 IN | OXYGEN SATURATION: 98 % | TEMPERATURE: 97.9 F | BODY MASS INDEX: 30.85 KG/M2 | SYSTOLIC BLOOD PRESSURE: 114 MMHG | DIASTOLIC BLOOD PRESSURE: 59 MMHG | WEIGHT: 185.19 LBS | RESPIRATION RATE: 17 BRPM

## 2025-04-02 PROCEDURE — 59025 FETAL NON-STRESS TEST: CPT

## 2025-04-02 PROCEDURE — 99213 OFFICE O/P EST LOW 20 MIN: CPT | Mod: 25

## 2025-04-02 PROCEDURE — 99214 OFFICE O/P EST MOD 30 MIN: CPT

## 2025-04-02 RX ORDER — ONDANSETRON 4 MG/1
4 TABLET, FILM COATED ORAL EVERY 6 HOURS PRN
Status: DISCONTINUED | OUTPATIENT
Start: 2025-04-02 | End: 2025-04-03 | Stop reason: HOSPADM

## 2025-04-02 RX ORDER — AMOXICILLIN AND CLAVULANATE POTASSIUM 875; 125 MG/1; MG/1
875 TABLET, FILM COATED ORAL 2 TIMES DAILY
COMMUNITY

## 2025-04-02 RX ORDER — LABETALOL HYDROCHLORIDE 5 MG/ML
20 INJECTION, SOLUTION INTRAVENOUS ONCE AS NEEDED
Status: DISCONTINUED | OUTPATIENT
Start: 2025-04-02 | End: 2025-04-03 | Stop reason: HOSPADM

## 2025-04-02 RX ORDER — ONDANSETRON HYDROCHLORIDE 2 MG/ML
4 INJECTION, SOLUTION INTRAVENOUS EVERY 6 HOURS PRN
Status: DISCONTINUED | OUTPATIENT
Start: 2025-04-02 | End: 2025-04-03 | Stop reason: HOSPADM

## 2025-04-02 RX ORDER — HYDRALAZINE HYDROCHLORIDE 20 MG/ML
5 INJECTION INTRAMUSCULAR; INTRAVENOUS ONCE AS NEEDED
Status: DISCONTINUED | OUTPATIENT
Start: 2025-04-02 | End: 2025-04-03 | Stop reason: HOSPADM

## 2025-04-02 RX ORDER — LIDOCAINE HYDROCHLORIDE 10 MG/ML
0.5 INJECTION, SOLUTION EPIDURAL; INFILTRATION; INTRACAUDAL; PERINEURAL ONCE AS NEEDED
Status: DISCONTINUED | OUTPATIENT
Start: 2025-04-02 | End: 2025-04-03 | Stop reason: HOSPADM

## 2025-04-02 SDOH — SOCIAL STABILITY: SOCIAL INSECURITY: DOES ANYONE TRY TO KEEP YOU FROM HAVING/CONTACTING OTHER FRIENDS OR DOING THINGS OUTSIDE YOUR HOME?: NO

## 2025-04-02 SDOH — HEALTH STABILITY: MENTAL HEALTH: WISH TO BE DEAD (PAST 1 MONTH): NO

## 2025-04-02 SDOH — ECONOMIC STABILITY: HOUSING INSECURITY: DO YOU FEEL UNSAFE GOING BACK TO THE PLACE WHERE YOU ARE LIVING?: NO

## 2025-04-02 SDOH — ECONOMIC STABILITY: FOOD INSECURITY: WITHIN THE PAST 12 MONTHS, THE FOOD YOU BOUGHT JUST DIDN'T LAST AND YOU DIDN'T HAVE MONEY TO GET MORE.: NEVER TRUE

## 2025-04-02 SDOH — SOCIAL STABILITY: SOCIAL INSECURITY: HAVE YOU HAD THOUGHTS OF HARMING ANYONE ELSE?: NO

## 2025-04-02 SDOH — SOCIAL STABILITY: SOCIAL INSECURITY: PHYSICAL ABUSE: DENIES

## 2025-04-02 SDOH — SOCIAL STABILITY: SOCIAL INSECURITY: VERBAL ABUSE: DENIES

## 2025-04-02 SDOH — SOCIAL STABILITY: SOCIAL INSECURITY
WITHIN THE LAST YEAR, HAVE YOU BEEN KICKED, HIT, SLAPPED, OR OTHERWISE PHYSICALLY HURT BY YOUR PARTNER OR EX-PARTNER?: NO

## 2025-04-02 SDOH — SOCIAL STABILITY: SOCIAL INSECURITY: WITHIN THE LAST YEAR, HAVE YOU BEEN HUMILIATED OR EMOTIONALLY ABUSED IN OTHER WAYS BY YOUR PARTNER OR EX-PARTNER?: NO

## 2025-04-02 SDOH — SOCIAL STABILITY: SOCIAL INSECURITY: HAVE YOU HAD ANY THOUGHTS OF HARMING ANYONE ELSE?: NO

## 2025-04-02 SDOH — SOCIAL STABILITY: SOCIAL INSECURITY: DO YOU FEEL ANYONE HAS EXPLOITED OR TAKEN ADVANTAGE OF YOU FINANCIALLY OR OF YOUR PERSONAL PROPERTY?: NO

## 2025-04-02 SDOH — SOCIAL STABILITY: SOCIAL INSECURITY
WITHIN THE LAST YEAR, HAVE YOU BEEN RAPED OR FORCED TO HAVE ANY KIND OF SEXUAL ACTIVITY BY YOUR PARTNER OR EX-PARTNER?: NO

## 2025-04-02 SDOH — SOCIAL STABILITY: SOCIAL INSECURITY: WITHIN THE LAST YEAR, HAVE YOU BEEN AFRAID OF YOUR PARTNER OR EX-PARTNER?: NO

## 2025-04-02 SDOH — ECONOMIC STABILITY: FOOD INSECURITY: WITHIN THE PAST 12 MONTHS, YOU WORRIED THAT YOUR FOOD WOULD RUN OUT BEFORE YOU GOT THE MONEY TO BUY MORE.: NEVER TRUE

## 2025-04-02 SDOH — HEALTH STABILITY: MENTAL HEALTH: NON-SPECIFIC ACTIVE SUICIDAL THOUGHTS (PAST 1 MONTH): NO

## 2025-04-02 SDOH — SOCIAL STABILITY: SOCIAL INSECURITY: ABUSE SCREEN: ADULT

## 2025-04-02 SDOH — SOCIAL STABILITY: SOCIAL INSECURITY: HAS ANYONE EVER THREATENED TO HURT YOUR FAMILY OR YOUR PETS?: NO

## 2025-04-02 SDOH — SOCIAL STABILITY: SOCIAL INSECURITY: ARE THERE ANY APPARENT SIGNS OF INJURIES/BEHAVIORS THAT COULD BE RELATED TO ABUSE/NEGLECT?: NO

## 2025-04-02 SDOH — HEALTH STABILITY: MENTAL HEALTH: SUICIDAL BEHAVIOR (LIFETIME): NO

## 2025-04-02 SDOH — HEALTH STABILITY: MENTAL HEALTH: WERE YOU ABLE TO COMPLETE ALL THE BEHAVIORAL HEALTH SCREENINGS?: YES

## 2025-04-02 SDOH — SOCIAL STABILITY: SOCIAL INSECURITY: ARE YOU OR HAVE YOU BEEN THREATENED OR ABUSED PHYSICALLY, EMOTIONALLY, OR SEXUALLY BY ANYONE?: NO

## 2025-04-02 ASSESSMENT — LIFESTYLE VARIABLES
AUDIT-C TOTAL SCORE: 0
HOW OFTEN DO YOU HAVE 6 OR MORE DRINKS ON ONE OCCASION: NEVER
HOW MANY STANDARD DRINKS CONTAINING ALCOHOL DO YOU HAVE ON A TYPICAL DAY: PATIENT DOES NOT DRINK
AUDIT-C TOTAL SCORE: 0
HOW OFTEN DO YOU HAVE A DRINK CONTAINING ALCOHOL: NEVER
SKIP TO QUESTIONS 9-10: 1

## 2025-04-02 ASSESSMENT — ACTIVITIES OF DAILY LIVING (ADL): LACK_OF_TRANSPORTATION: NO

## 2025-04-02 ASSESSMENT — PAIN SCALES - GENERAL
PAINLEVEL_OUTOF10: 0 - NO PAIN
PAINLEVEL_OUTOF10: 0 - NO PAIN

## 2025-04-02 ASSESSMENT — PATIENT HEALTH QUESTIONNAIRE - PHQ9
2. FEELING DOWN, DEPRESSED OR HOPELESS: NOT AT ALL
1. LITTLE INTEREST OR PLEASURE IN DOING THINGS: NOT AT ALL
SUM OF ALL RESPONSES TO PHQ9 QUESTIONS 1 & 2: 0

## 2025-04-03 NOTE — H&P
OB Triage H&P    Assessment/Plan    Melissa German is a 29 y.o.  at 33w6d, MANAS: 5/15/2025, by Last Menstrual Period, who presents to triage with DFM .    Plan   -Fetal monitoring reassuring  -Good fetal movement since arrive to triage   -Up to date on prenatal care  -Continue routine prenatal care    Dispo  -Patient appropriate for discharge home, agrees with plan  -Return precautions discussed   -Follow up at next scheduled OB appointment or to triage sooner as needed    Discussed plan and reviewed with: Dr Thompson     Pregnancy Problems (from 09/10/24 to present)       Problem Noted Diagnosed Resolved    32 weeks gestation of pregnancy ([facility]) 9/10/2024 by Myriam Churchill MD  No    Priority:  Medium       Overview Addendum 3/25/2025 11:58 AM by Myriam Churchill MD     Desired provider in labor: [x] CNM  [] Physician  [x] Blood Products: [x] Yes, accepts [] No, needs counseling  [x] Initial BMI: 24.76   [x] Prenatal Labs:   [x] Cervical Cancer Screening up to date: 2024 - WNL (records scanned in)  [x] Rh status: O pos  [x] Genetic Screening:  RR NIPS, it's a BOY   [x] NT US: (11-13 wks): WNL  [x] Pregnancy dated by:  LMP c/w 7 week in-office USN     [x] Anatomy US: (19-20 wks): WNL other than low-lying placenta   [] Federal Sterilization consent signed (if indicated):  [] 1hr GCT at 24-28wks: <> repeat glucola collected today as was not run by lab at last draw   [] Fetal Surveillance (if indicated):  [x] Tdap: Declines   [x] Flu Vaccine: Declines   [x] Updated COVID vaccine recommended     [x] Breastfeeding: plans to breast feed - has pump ordered   [] Postpartum Birth control method:   [] GBS at 36 - 37 wks:  [] 39 weeks discussion of IOL vs. Expectant management:  [] Mode of delivery ( anticipated ): TOLAC                    Subjective  Pt presents today with c/o DFM. She states she has had a very stressful day at home today and felt like movement was less than normal overall, though  movement has been normal for her since she has arrived to triage.  Denies vaginal bleeding., Denies contractions., Denies leaking of fluid.      Prenatal Provider Dr Churchill     OB History    Para Term  AB Living   2 1 1 0 0 1   SAB IAB Ectopic Multiple Live Births   0 0 0 0 1      # Outcome Date GA Lbr Adolfo/2nd Weight Sex Type Anes PTL Lv   2 Current            1 Term 23 39w0d  3.81 kg F CS-LTranv Spinal N SVITLANA      Name: Alley       Past Surgical History:   Procedure Laterality Date     SECTION, LOW TRANSVERSE      WISDOM TOOTH EXTRACTION Bilateral        Social History     Tobacco Use    Smoking status: Never    Smokeless tobacco: Never   Substance Use Topics    Alcohol use: Never       No Known Allergies    Medications Prior to Admission   Medication Sig Dispense Refill Last Dose/Taking    amoxicillin-pot clavulanate (Augmentin) 875-125 mg tablet Take 1 tablet (875 mg) by mouth 2 times a day.       levothyroxine (Synthroid, Levoxyl) 50 mcg tablet Take 1 tablet (50 mcg) by mouth once daily.       prenatal no115/iron/folic acid (PRENATAL 19 ORAL) Take by mouth.        Objective     Last Vitals  Temp Pulse Resp BP MAP O2 Sat   36.6 °C (97.9 °F) 90 17 122/66 87 98 %     Blood Pressures         2025             BP: 122/66             Physical Exam  General: NAD, mood appropriate  Cardiopulmonary: warm and well perfused, breathing comfortably on room air  Abdomen: Gravid, non-tender  Extremities: Symmetric  Speculum Exam: deferred  Cervix:   /  /   - def      Fetal Monitoring  Baseline: 125 bpm, Variability: moderate,  Accelerations: present and Decelerations: none  Uterine Activity: No contractions seen on toco  Interpretation: Reactive    Bedside ultrasound: No    Labs in chart were reviewed.          Prenatal labs reviewed, not remarkable.

## 2025-04-09 ENCOUNTER — LAB (OUTPATIENT)
Dept: LAB | Facility: CLINIC | Age: 30
End: 2025-04-09
Payer: COMMERCIAL

## 2025-04-09 ENCOUNTER — OFFICE VISIT (OUTPATIENT)
Dept: HEMATOLOGY/ONCOLOGY | Facility: CLINIC | Age: 30
End: 2025-04-09
Payer: COMMERCIAL

## 2025-04-09 VITALS
WEIGHT: 186.73 LBS | DIASTOLIC BLOOD PRESSURE: 78 MMHG | SYSTOLIC BLOOD PRESSURE: 155 MMHG | RESPIRATION RATE: 18 BRPM | TEMPERATURE: 96.8 F | HEART RATE: 101 BPM | BODY MASS INDEX: 31.07 KG/M2 | OXYGEN SATURATION: 97 %

## 2025-04-09 DIAGNOSIS — D69.6 THROMBOCYTOPENIA (CMS-HCC): ICD-10-CM

## 2025-04-09 DIAGNOSIS — E03.9 HYPOTHYROIDISM, UNSPECIFIED TYPE: ICD-10-CM

## 2025-04-09 DIAGNOSIS — Z3A.33 33 WEEKS GESTATION OF PREGNANCY (HHS-HCC): ICD-10-CM

## 2025-04-09 DIAGNOSIS — D69.6 THROMBOCYTOPENIA (CMS-HCC): Primary | ICD-10-CM

## 2025-04-09 LAB
BASOPHILS # BLD AUTO: 0.03 X10*3/UL (ref 0–0.1)
BASOPHILS NFR BLD AUTO: 0.3 %
EOSINOPHIL # BLD AUTO: 0.1 X10*3/UL (ref 0–0.7)
EOSINOPHIL NFR BLD AUTO: 1 %
ERYTHROCYTE [DISTWIDTH] IN BLOOD BY AUTOMATED COUNT: 12.9 % (ref 11.5–14.5)
HCT VFR BLD AUTO: 41.4 % (ref 36–46)
HGB BLD-MCNC: 13.5 G/DL (ref 12–16)
IMM GRANULOCYTES # BLD AUTO: 0.08 X10*3/UL (ref 0–0.7)
IMM GRANULOCYTES NFR BLD AUTO: 0.8 % (ref 0–0.9)
LYMPHOCYTES # BLD AUTO: 1.42 X10*3/UL (ref 1.2–4.8)
LYMPHOCYTES NFR BLD AUTO: 13.9 %
MCH RBC QN AUTO: 27.8 PG (ref 26–34)
MCHC RBC AUTO-ENTMCNC: 32.6 G/DL (ref 32–36)
MCV RBC AUTO: 85 FL (ref 80–100)
MONOCYTES # BLD AUTO: 0.58 X10*3/UL (ref 0.1–1)
MONOCYTES NFR BLD AUTO: 5.7 %
NEUTROPHILS # BLD AUTO: 7.98 X10*3/UL (ref 1.2–7.7)
NEUTROPHILS NFR BLD AUTO: 78.3 %
PLATELET # BLD AUTO: 88 X10*3/UL (ref 150–450)
RBC # BLD AUTO: 4.85 X10*6/UL (ref 4–5.2)
WBC # BLD AUTO: 10.2 X10*3/UL (ref 4.4–11.3)

## 2025-04-09 PROCEDURE — 99204 OFFICE O/P NEW MOD 45 MIN: CPT | Performed by: INTERNAL MEDICINE

## 2025-04-09 PROCEDURE — 36415 COLL VENOUS BLD VENIPUNCTURE: CPT

## 2025-04-09 PROCEDURE — 85025 COMPLETE CBC W/AUTO DIFF WBC: CPT

## 2025-04-09 PROCEDURE — 99214 OFFICE O/P EST MOD 30 MIN: CPT | Mod: 25 | Performed by: INTERNAL MEDICINE

## 2025-04-09 ASSESSMENT — PAIN SCALES - GENERAL: PAINLEVEL_OUTOF10: 0-NO PAIN

## 2025-04-09 NOTE — PROGRESS NOTES
Patient ID: Melissa German is a 29 y.o. female.  Referring Physician: Myriam Churchill [place]  [address] [place] [address]  Primary Care Provider: No primary care provider on file.  Visit Type: Initial Visit      Subjective    HPI I am currently 35 weeks pregnant  I had bloodwork checked in [place], including when I was pregnant with my first child, and I was never told that my platelets have been low    Review of Systems   Constitutional: Negative.    HENT:  Negative.     Eyes: Negative.    Respiratory: Negative.     Cardiovascular: Negative.    Gastrointestinal: Negative.    Endocrine: Negative.    Genitourinary: Negative.     Musculoskeletal: Negative.    Skin: Negative.    Neurological: Negative.    Hematological: Negative.    Psychiatric/Behavioral: Negative.          Objective   BSA: There is no height or weight on file to calculate BSA.  LMP 2024 (Exact Date)      has no past medical history on file.   has a past surgical history that includes  section, low transverse and Anderson tooth extraction (Bilateral).  No family history on file.  Oncology History    No history exists.       Melissa German  reports that she has never smoked. She has never used smokeless tobacco.  She  reports no history of alcohol use.  She  reports no history of drug use.    Physical Exam  Vitals reviewed.   Constitutional:       Appearance: Normal appearance.   HENT:      Head: Normocephalic.      Mouth/Throat:      Mouth: Mucous membranes are moist.   Eyes:      Extraocular Movements: Extraocular movements intact.      Pupils: Pupils are equal, round, and reactive to light.   Cardiovascular:      Rate and Rhythm: Normal rate and regular rhythm.      Pulses: Normal pulses.      Heart sounds: Normal heart sounds.   Pulmonary:      Effort: Pulmonary effort is normal.      Breath sounds: Normal breath sounds.   Abdominal:      General: Bowel sounds are normal. There is distension.   Musculoskeletal:          General: Normal range of motion.      Cervical back: Normal range of motion and neck supple.   Skin:     General: Skin is warm.   Neurological:      General: No focal deficit present.      Mental Status: She is alert and oriented to person, place, and time.   Psychiatric:         Mood and Affect: Mood normal.         Behavior: Behavior normal.         WBC   Date/Time Value Ref Range Status   02/25/2025 12:05 PM 9.0 4.4 - 11.3 x10*3/uL Final   10/21/2024 09:59 AM 7.4 4.4 - 11.3 x10*3/uL Final     WHITE BLOOD CELL COUNT   Date/Time Value Ref Range Status   03/25/2025 12:11 PM 8.9 3.8 - 10.8 Thousand/uL Final     nRBC   Date Value Ref Range Status   02/25/2025 0.0 0.0 - 0.0 /100 WBCs Final   10/21/2024 0.0 0.0 - 0.0 /100 WBCs Final     RBC   Date Value Ref Range Status   02/25/2025 4.40 4.00 - 5.20 x10*6/uL Final   10/21/2024 4.88 4.00 - 5.20 x10*6/uL Final     RED BLOOD CELL COUNT   Date Value Ref Range Status   03/25/2025 4.85 3.80 - 5.10 Million/uL Final     Hemoglobin   Date Value Ref Range Status   02/25/2025 12.7 12.0 - 16.0 g/dL Final   10/21/2024 13.3 12.0 - 16.0 g/dL Final     HEMOGLOBIN   Date Value Ref Range Status   03/25/2025 13.6 11.7 - 15.5 g/dL Final     Hematocrit   Date Value Ref Range Status   02/25/2025 38.9 36.0 - 46.0 % Final   10/21/2024 41.7 36.0 - 46.0 % Final     HEMATOCRIT   Date Value Ref Range Status   03/25/2025 41.3 35.0 - 45.0 % Final     MCV   Date/Time Value Ref Range Status   03/25/2025 12:11 PM 85.2 80.0 - 100.0 fL Final   02/25/2025 12:05 PM 88 80 - 100 fL Final   10/21/2024 09:59 AM 86 80 - 100 fL Final     MCH   Date/Time Value Ref Range Status   03/25/2025 12:11 PM 28.0 27.0 - 33.0 pg Final   02/25/2025 12:05 PM 28.9 26.0 - 34.0 pg Final   10/21/2024 09:59 AM 27.3 26.0 - 34.0 pg Final     MCHC   Date/Time Value Ref Range Status   03/25/2025 12:11 PM 32.9 32.0 - 36.0 g/dL Final     Comment:     For adults, a slight decrease in the calculated MCHC  value (in the range of 30  "to 32 g/dL) is most likely  not clinically significant; however, it should be  interpreted with caution in correlation with other  red cell parameters and the patient's clinical  condition.     02/25/2025 12:05 PM 32.6 32.0 - 36.0 g/dL Final   10/21/2024 09:59 AM 31.9 (L) 32.0 - 36.0 g/dL Final     RDW   Date/Time Value Ref Range Status   03/25/2025 12:11 PM 11.8 11.0 - 15.0 % Final   02/25/2025 12:05 PM 13.1 11.5 - 14.5 % Final   10/21/2024 09:59 AM 12.8 11.5 - 14.5 % Final     Platelets   Date/Time Value Ref Range Status   02/25/2025 12:05 PM 98 (L) 150 - 450 x10*3/uL Final   10/21/2024 09:59  150 - 450 x10*3/uL Final     PLATELET COUNT   Date/Time Value Ref Range Status   03/25/2025 12:11 PM 93 (L) 140 - 400 Thousand/uL Final     MPV   Date/Time Value Ref Range Status   03/25/2025 12:11 PM 12.4 7.5 - 12.5 fL Final     No results found for: \"NEUTOPHILPCT\"  No results found for: \"IGPCT\"  No results found for: \"LYMPHOPCT\"  No results found for: \"MONOPCT\"  No results found for: \"EOSPCT\"  No results found for: \"BASOPCT\"  No results found for: \"NEUTROABS\"  No results found for: \"IGABSOL\"  No results found for: \"LYMPHSABS\"  No results found for: \"MONOSABS\"  No results found for: \"EOSABS\"  No results found for: \"BASOSABS\"    No components found for: \"PT\"  No results found for: \"APTT\"  Medication Documentation Review Audit       Reviewed by Myriam Churchill MD (Physician) on 04/11/25 at 1720      Medication Order Taking? Sig Documenting Provider Last Dose Status   amoxicillin-pot clavulanate (Augmentin) 875-125 mg tablet [id number]  Take 1 tablet (875 mg) by mouth 2 times a day.   Patient not taking: Reported on 4/11/2025    Historical Provider, MD  Active   levothyroxine (Synthroid, Levoxyl) 50 mcg tablet [id number] Yes Take 1 tablet (50 mcg) by mouth once daily. Historical Provider, MD  Active   prenatal no115/iron/folic acid (PRENATAL 19 ORAL) [id number] Yes Take by mouth. Historical Provider, MD  Active      "              Assessment/Plan    1) thrombocytopenia  -she used to live in [place] and also had her first child there; labwork was done regularly, even while pregnant, and she does not ever recall being told her platelet count was low  -she will check her medical records  -I reviewed her lab history  -10/21/2024 wbc 7.4, hgb 13.3, plt 150,000  -2/25/2025 wbc 9.0, hgb 12.7, plt 98,000 (done at [facility], collected 1205 PM, run 2131 PM)  -3/25/2025 wbc 8.9, hgb 13.6, plt 93,000, collected 1211 PM, run 6 days later at 1635 PM on 3/31/2025 (she says specimen was lost, then found)  -unclear if she may have autoimmune ITP at baseline-- in which case her plt count will be low at baseline, and get worse as pregnancy progresses  -gestational thrombocytopenia appears only during pregnancy, never dips below 100,000, and resolves after delivery  -will check CBC/diff freshly in office and also review peripheral smear  -wbc 10.2, hgb 13.5, plt 88,000, ANC 7980  -likely has ITP--and may need steroids in last portion of 3rd trimester  -will recheck CBC in 2 weeks    2) active pregnancy  -currently 35 weeks pregnant  -EDC 5/15/2025     3) hypothyroidism  -on levothyroxine  Problem List Items Addressed This Visit             ICD-10-CM    Thrombocytopenia (CMS-[facility]) D69.6    Relevant Orders    CBC and Auto Differential (Completed)    Slide Request (Completed)    Clinic Appointment Request Virtual Est; DANIEL GALE; [facility]    CBC and Auto Differential     Other Visit Diagnoses         Codes    33 weeks gestation of pregnancy ([facility])     Z3A.33                 Daniel Gale MD

## 2025-04-11 ENCOUNTER — APPOINTMENT (OUTPATIENT)
Dept: OBSTETRICS AND GYNECOLOGY | Facility: CLINIC | Age: 30
End: 2025-04-11
Payer: COMMERCIAL

## 2025-04-11 VITALS — SYSTOLIC BLOOD PRESSURE: 129 MMHG | BODY MASS INDEX: 31.78 KG/M2 | DIASTOLIC BLOOD PRESSURE: 83 MMHG | WEIGHT: 191 LBS

## 2025-04-11 DIAGNOSIS — Z3A.35 35 WEEKS GESTATION OF PREGNANCY (HHS-HCC): Primary | ICD-10-CM

## 2025-04-11 DIAGNOSIS — Z98.891 HISTORY OF CESAREAN SECTION: ICD-10-CM

## 2025-04-11 DIAGNOSIS — E03.9 HYPOTHYROIDISM, UNSPECIFIED TYPE: ICD-10-CM

## 2025-04-11 DIAGNOSIS — D69.6 THROMBOCYTOPENIA (CMS-HCC): ICD-10-CM

## 2025-04-11 NOTE — PROGRESS NOTES
Routine prenatal visit     Subjective    HPI:  Pt doing well overall - uncomfortable with upper abdominal pain - likely related to fetal position.  GBS next visit.  Seeing Dr. Gale for likely ITP.     Objective    Vital Signs  /83   Wt 86.6 kg (191 lb)   LMP 2024 (Exact Date)   BMI 31.78 kg/m²     Melissa German is a 29 y.o. yo  at 35w1d here for the following concerns which we addressed today:     Medical Problems       Problem List       35 weeks gestation of pregnancy ([facility])    Overview Addendum 2025  5:21 PM by Myriam Churchill MD     Desired provider in labor: [x] CNM  [] Physician  [x] Blood Products: [x] Yes, accepts [] No, needs counseling  [x] Initial BMI: 24.76   [x] Prenatal Labs:   [x] Cervical Cancer Screening up to date: 2024 - WNL (records scanned in)  [x] Rh status: O pos  [x] Genetic Screening:  RR NIPS, it's a BOY   [x] NT US: (11-13 wks): WNL  [x] Pregnancy dated by:  LMP c/w 7 week in-office USN     [x] Anatomy US: (19-20 wks): WNL other than low-lying placenta   [] Federal Sterilization consent signed (if indicated):  [x] 1hr GCT at 24-28wks WNL  [] Fetal Surveillance (if indicated):  [x] Tdap: Declines   [x] Flu Vaccine: Declines   [x] Updated COVID vaccine recommended     [x] Breastfeeding: plans to breast feed - has pump ordered   [] Postpartum Birth control method:   [] GBS at 36 - 37 wks: < >next visit   [] 39 weeks discussion of IOL vs. Expectant management:  [] Mode of delivery ( anticipated ): TOLAC            History of  section    Overview Addendum 2025  5:27 PM by Myriam Churchill MD     - c/s for breech at 39 weeks in [place] - unable to get operative report but no reason to suspect incision other than low transverse   - MFMU 72.7%  - Planning TOLAC   - Aware of need to be delivered by 41.0 either with IOL or RCS          Hypothyroidism    Overview Addendum 2025  5:25 PM by Myriam Churchill MD     - on synthroid 50mcg  daily          Low-lying placenta ([facility])    Overview Addendum 3/7/2025 12:13 PM by Myriam Churchill MD     - 1.2 cm from os on anatomy USN   --> RESOLVED at 30 week USN            Thrombocytopenia (CMS-HCC)    Overview Addendum 4/11/2025  5:23 PM by Myriam Churchill MD     - platelets 98 with glucola    <> seeing Daniel Gale - likely ITP              Follow up in 1 week(s).

## 2025-04-12 PROBLEM — Z3A.33 33 WEEKS GESTATION OF PREGNANCY (HHS-HCC): Status: ACTIVE | Noted: 2025-04-12

## 2025-04-12 ASSESSMENT — ENCOUNTER SYMPTOMS
CARDIOVASCULAR NEGATIVE: 1
HEMATOLOGIC/LYMPHATIC NEGATIVE: 1
NEUROLOGICAL NEGATIVE: 1
PSYCHIATRIC NEGATIVE: 1
RESPIRATORY NEGATIVE: 1
GASTROINTESTINAL NEGATIVE: 1
MUSCULOSKELETAL NEGATIVE: 1
EYES NEGATIVE: 1
CONSTITUTIONAL NEGATIVE: 1
ENDOCRINE NEGATIVE: 1

## 2025-04-17 ENCOUNTER — HOSPITAL ENCOUNTER (OUTPATIENT)
Dept: RADIOLOGY | Facility: CLINIC | Age: 30
Discharge: HOME | End: 2025-04-17
Payer: COMMERCIAL

## 2025-04-17 DIAGNOSIS — Z34.90 PRENATAL CARE, ANTEPARTUM: ICD-10-CM

## 2025-04-17 PROCEDURE — 76816 OB US FOLLOW-UP PER FETUS: CPT

## 2025-04-18 ENCOUNTER — APPOINTMENT (OUTPATIENT)
Dept: OBSTETRICS AND GYNECOLOGY | Facility: CLINIC | Age: 30
End: 2025-04-18
Payer: COMMERCIAL

## 2025-04-18 VITALS — BODY MASS INDEX: 31.28 KG/M2 | DIASTOLIC BLOOD PRESSURE: 81 MMHG | WEIGHT: 188 LBS | SYSTOLIC BLOOD PRESSURE: 125 MMHG

## 2025-04-18 DIAGNOSIS — Z34.93 PRENATAL CARE IN THIRD TRIMESTER, UNSPECIFIED GRAVIDITY: ICD-10-CM

## 2025-04-18 DIAGNOSIS — E03.9 HYPOTHYROIDISM, UNSPECIFIED TYPE: ICD-10-CM

## 2025-04-18 DIAGNOSIS — Z3A.36 36 WEEKS GESTATION OF PREGNANCY (HHS-HCC): Primary | ICD-10-CM

## 2025-04-18 DIAGNOSIS — O44.40 LOW-LYING PLACENTA (HHS-HCC): ICD-10-CM

## 2025-04-18 DIAGNOSIS — O36.60X0 FETAL MACROSOMIA AFFECTING MANAGEMENT OF MOTHER, ANTEPARTUM (HHS-HCC): ICD-10-CM

## 2025-04-18 DIAGNOSIS — Z98.891 HISTORY OF CESAREAN SECTION: ICD-10-CM

## 2025-04-18 DIAGNOSIS — D69.6 THROMBOCYTOPENIA (CMS-HCC): ICD-10-CM

## 2025-04-18 PROBLEM — Z3A.33 33 WEEKS GESTATION OF PREGNANCY (HHS-HCC): Status: RESOLVED | Noted: 2025-04-12 | Resolved: 2025-04-18

## 2025-04-18 PROCEDURE — 0501F PRENATAL FLOW SHEET: CPT | Performed by: OBSTETRICS & GYNECOLOGY

## 2025-04-18 NOTE — PROGRESS NOTES
"Routine prenatal visit     Subjective    HPI:  Routine OB follow up visit.  Pt had a 36 week growth USN recommended by M due to hypothyroidism.  The USN showed an EFW >99% with AC >99%.  The weight extrapolates to 4450g at 39.0, 4950g at 41.0.  Pt is tearful today and wants to discuss delivery plans.     Her last c/s was very traumatic for her but it was not the surgery itself but rather the post-op care.  She likely had a large rectus sheath hematoma (1.5L) which was not recognized until her  became very concerned about her appearance and vital signs (he is a paramedic).  Ultimately her hgb once checked was 5.  She had some initial difficulty bonding with her child due to this period of time where she was very \"foggy.\"    Long discussion with pt and partner today about pros/cons of TOLAC vs RCS in setting of now suspected macrosomia.  Discussed option of c/s at 39 weeks.  Also discussed option of waiting for spontaneous labor and scheduling c/s around 40 weeks if she doesn't labor spontaneously.  Also discussed possibility of IOL.  Her cervix is closed today.  She wants to think about everything over the weekend and will call Monday and we can go from there.     GBS done today.     Objective    Vital Signs  /81   Wt 85.3 kg (188 lb)   LMP 2024 (Exact Date)   BMI 31.28 kg/m²     Melissa German is a 29 y.o. yo  at 36w1d here for the following concerns which we addressed today:     Medical Problems       Problem List       36 weeks gestation of pregnancy ([facility])    Overview Addendum 2025  2:39 PM by Myriam Churchill MD   Desired provider in labor: [x] CNM  [] Physician  [x] Blood Products: [x] Yes, accepts [] No, needs counseling  [x] Initial BMI: 24.76   [x] Prenatal Labs:   [x] Cervical Cancer Screening up to date: 2024 - WNL (records scanned in)  [x] Rh status: O pos  [x] Genetic Screening:  RR NIPS, it's a BOY   [x] NT US: (11-13 wks): WNL  [x] Pregnancy dated by:  LMP " c/w 7 week in-office USN     [x] Anatomy US: (19-20 wks): WNL other than low-lying placenta   [] Federal Sterilization consent signed (if indicated):  [x] 1hr GCT at 24-28wks WNL  [] Fetal Surveillance (if indicated):  [x] Tdap: Declines   [x] Flu Vaccine: Declines   [x] Updated COVID vaccine recommended     [x] Breastfeeding: plans to breast feed - has pump ordered   [] Postpartum Birth control method:   [x] GBS at 36 - 37 wks: collected    [] 39 weeks discussion of IOL vs. Expectant management:  [] Mode of delivery ( anticipated ): TOLAC            History of  section    Overview Addendum 2025  2:43 PM by Myriam Churchill MD   - c/s for breech at 39 weeks in [place] - unable to get operative report but no reason to suspect incision other than low transverse   - likely large rectus sheath hematoma (1.5L) for which there was a delay in recognition - pt had hemoglobin of 5 ultimately when checked   - MFMU 72.7%  - Planning TOLAC   - Aware of need to be delivered by 41.0 either with IOL or RCS          Hypothyroidism    Overview Addendum 2025  5:25 PM by Myriam Churchill MD   - on synthroid 50mcg daily          Low-lying placenta ([facility])    Overview Addendum 3/7/2025 12:13 PM by Myriam Churchill MD   - 1.2 cm from os on anatomy USN   --> RESOLVED at 30 week USN            Thrombocytopenia (CMS-HCC)    Overview Addendum 2025  5:23 PM by Myriam Churchill MD   - platelets 98 with glucola    <> seeing Daniel Gale - likely ITP          Fetal macrosomia affecting management of mother, antepartum ([facility])    Overview Signed 2025  2:41 PM by Myriam Churchill MD   - 36 week growth USN - EFW 3700 grams (>99%) with AC >99%               Follow up in 1 week(s).

## 2025-04-21 LAB — GP B STREP SPEC QL CULT: NORMAL

## 2025-04-22 ENCOUNTER — LAB (OUTPATIENT)
Dept: LAB | Facility: CLINIC | Age: 30
End: 2025-04-22
Payer: COMMERCIAL

## 2025-04-22 DIAGNOSIS — D69.6 THROMBOCYTOPENIA (CMS-HCC): ICD-10-CM

## 2025-04-22 LAB
BASOPHILS # BLD AUTO: 0.04 X10*3/UL (ref 0–0.1)
BASOPHILS NFR BLD AUTO: 0.5 %
EOSINOPHIL # BLD AUTO: 0.09 X10*3/UL (ref 0–0.7)
EOSINOPHIL NFR BLD AUTO: 1 %
ERYTHROCYTE [DISTWIDTH] IN BLOOD BY AUTOMATED COUNT: 13.1 % (ref 11.5–14.5)
HCT VFR BLD AUTO: 38.9 % (ref 36–46)
HGB BLD-MCNC: 12.8 G/DL (ref 12–16)
IMM GRANULOCYTES # BLD AUTO: 0.11 X10*3/UL (ref 0–0.7)
IMM GRANULOCYTES NFR BLD AUTO: 1.2 % (ref 0–0.9)
LYMPHOCYTES # BLD AUTO: 1.47 X10*3/UL (ref 1.2–4.8)
LYMPHOCYTES NFR BLD AUTO: 16.6 %
MCH RBC QN AUTO: 28.5 PG (ref 26–34)
MCHC RBC AUTO-ENTMCNC: 32.9 G/DL (ref 32–36)
MCV RBC AUTO: 87 FL (ref 80–100)
MONOCYTES # BLD AUTO: 0.66 X10*3/UL (ref 0.1–1)
MONOCYTES NFR BLD AUTO: 7.5 %
NEUTROPHILS # BLD AUTO: 6.46 X10*3/UL (ref 1.2–7.7)
NEUTROPHILS NFR BLD AUTO: 73.2 %
PLATELET # BLD AUTO: 71 X10*3/UL (ref 150–450)
RBC # BLD AUTO: 4.49 X10*6/UL (ref 4–5.2)
WBC # BLD AUTO: 8.8 X10*3/UL (ref 4.4–11.3)

## 2025-04-22 PROCEDURE — 85025 COMPLETE CBC W/AUTO DIFF WBC: CPT | Performed by: INTERNAL MEDICINE

## 2025-04-22 PROCEDURE — 36415 COLL VENOUS BLD VENIPUNCTURE: CPT | Performed by: INTERNAL MEDICINE

## 2025-04-23 ENCOUNTER — TELEPHONE (OUTPATIENT)
Dept: OBSTETRICS AND GYNECOLOGY | Facility: CLINIC | Age: 30
End: 2025-04-23

## 2025-04-23 ENCOUNTER — TELEMEDICINE (OUTPATIENT)
Dept: HEMATOLOGY/ONCOLOGY | Facility: CLINIC | Age: 30
End: 2025-04-23
Payer: COMMERCIAL

## 2025-04-23 ENCOUNTER — PREP FOR PROCEDURE (OUTPATIENT)
Dept: OBSTETRICS AND GYNECOLOGY | Facility: HOSPITAL | Age: 30
End: 2025-04-23
Payer: COMMERCIAL

## 2025-04-23 DIAGNOSIS — D69.6 THROMBOCYTOPENIA (CMS-HCC): ICD-10-CM

## 2025-04-23 DIAGNOSIS — Z3A.39 39 WEEKS GESTATION OF PREGNANCY (HHS-HCC): Primary | ICD-10-CM

## 2025-04-23 DIAGNOSIS — E03.9 HYPOTHYROIDISM, UNSPECIFIED TYPE: ICD-10-CM

## 2025-04-23 DIAGNOSIS — D69.3 IDIOPATHIC THROMBOCYTOPENIC PURPURA (MULTI): Primary | ICD-10-CM

## 2025-04-23 DIAGNOSIS — Z3A.39 39 WEEKS GESTATION OF PREGNANCY (HHS-HCC): ICD-10-CM

## 2025-04-23 PROCEDURE — 99213 OFFICE O/P EST LOW 20 MIN: CPT | Performed by: INTERNAL MEDICINE

## 2025-04-23 RX ORDER — PREDNISONE 10 MG/1
TABLET ORAL
Qty: 100 TABLET | Refills: 1 | Status: SHIPPED | OUTPATIENT
Start: 2025-04-23

## 2025-04-23 RX ORDER — CEFAZOLIN SODIUM 2 G/100ML
2 INJECTION, SOLUTION INTRAVENOUS ONCE
OUTPATIENT
Start: 2025-04-23 | End: 2025-04-23

## 2025-04-23 NOTE — PROGRESS NOTES
Patient ID: Melissa German is a 29 y.o. female.  Referring Physician: Daniel Gale MD  [id number]  [place] [id number]  Primary Care Provider: @PCP@  Visit Type:  Follow Up     Virtual or Telephone Consent    An interactive audio and video telecommunication system which permits real time communications between the patient (at the originating site) and provider (at the distant site) was utilized to provide this telehealth service.   Verbal consent was requested and obtained from Melissa German on this date, 2025 for a telehealth visit and the patient's location was confirmed at the time of the visit.     Subjective    HPI I was able to find my old platelet counts    Review of Systems   Constitutional: Negative.    HENT:  Negative.     Eyes: Negative.    Respiratory: Negative.     Cardiovascular: Negative.    Gastrointestinal: Negative.    Endocrine: Negative.    Genitourinary: Negative.     Musculoskeletal: Negative.    Skin: Negative.    Neurological: Negative.    Hematological: Negative.    Psychiatric/Behavioral: Negative.          Objective   BSA: There is no height or weight on file to calculate BSA.  LMP 2024 (Exact Date)      has no past medical history on file.   has a past surgical history that includes  section, low transverse and Sullivan tooth extraction (Bilateral).  Family History[1]  Oncology History    No history exists.       Melissa German  reports that she has never smoked. She has never used smokeless tobacco.  She  reports no history of alcohol use.  She  reports no history of drug use.    Physical Exam  Vitals reviewed.   Constitutional:       Appearance: Normal appearance.   HENT:      Head: Normocephalic.      Mouth/Throat:      Mouth: Mucous membranes are moist.   Eyes:      Extraocular Movements: Extraocular movements intact.      Pupils: Pupils are equal, round, and reactive to light.   Cardiovascular:      Rate and Rhythm: Normal rate and regular  rhythm.      Pulses: Normal pulses.      Heart sounds: Normal heart sounds.   Pulmonary:      Effort: Pulmonary effort is normal.      Breath sounds: Normal breath sounds.   Abdominal:      General: Bowel sounds are normal.      Palpations: Abdomen is soft.   Musculoskeletal:         General: Normal range of motion.      Cervical back: Normal range of motion and neck supple.   Skin:     General: Skin is warm.   Neurological:      General: No focal deficit present.      Mental Status: She is alert and oriented to person, place, and time.   Psychiatric:         Mood and Affect: Mood normal.         Behavior: Behavior normal.         WBC   Date/Time Value Ref Range Status   04/22/2025 10:12 AM 8.8 4.4 - 11.3 x10*3/uL Final   04/09/2025 10:35 AM 10.2 4.4 - 11.3 x10*3/uL Final   02/25/2025 12:05 PM 9.0 4.4 - 11.3 x10*3/uL Final     WHITE BLOOD CELL COUNT   Date/Time Value Ref Range Status   03/25/2025 12:11 PM 8.9 3.8 - 10.8 Thousand/uL Final     nRBC   Date Value Ref Range Status   02/25/2025 0.0 0.0 - 0.0 /100 WBCs Final   10/21/2024 0.0 0.0 - 0.0 /100 WBCs Final     RBC   Date Value Ref Range Status   04/22/2025 4.49 4.00 - 5.20 x10*6/uL Final   04/09/2025 4.85 4.00 - 5.20 x10*6/uL Final   02/25/2025 4.40 4.00 - 5.20 x10*6/uL Final     RED BLOOD CELL COUNT   Date Value Ref Range Status   03/25/2025 4.85 3.80 - 5.10 Million/uL Final     Hemoglobin   Date Value Ref Range Status   04/22/2025 12.8 12.0 - 16.0 g/dL Final   04/09/2025 13.5 12.0 - 16.0 g/dL Final   02/25/2025 12.7 12.0 - 16.0 g/dL Final     HEMOGLOBIN   Date Value Ref Range Status   03/25/2025 13.6 11.7 - 15.5 g/dL Final     Hematocrit   Date Value Ref Range Status   04/22/2025 38.9 36.0 - 46.0 % Final   04/09/2025 41.4 36.0 - 46.0 % Final   02/25/2025 38.9 36.0 - 46.0 % Final     HEMATOCRIT   Date Value Ref Range Status   03/25/2025 41.3 35.0 - 45.0 % Final     MCV   Date/Time Value Ref Range Status   04/22/2025 10:12 AM 87 80 - 100 fL Final   04/09/2025  10:35 AM 85 80 - 100 fL Final   03/25/2025 12:11 PM 85.2 80.0 - 100.0 fL Final   02/25/2025 12:05 PM 88 80 - 100 fL Final     MCH   Date/Time Value Ref Range Status   04/22/2025 10:12 AM 28.5 26.0 - 34.0 pg Final   04/09/2025 10:35 AM 27.8 26.0 - 34.0 pg Final   03/25/2025 12:11 PM 28.0 27.0 - 33.0 pg Final   02/25/2025 12:05 PM 28.9 26.0 - 34.0 pg Final     MCHC   Date/Time Value Ref Range Status   04/22/2025 10:12 AM 32.9 32.0 - 36.0 g/dL Final   04/09/2025 10:35 AM 32.6 32.0 - 36.0 g/dL Final   03/25/2025 12:11 PM 32.9 32.0 - 36.0 g/dL Final     Comment:     For adults, a slight decrease in the calculated MCHC  value (in the range of 30 to 32 g/dL) is most likely  not clinically significant; however, it should be  interpreted with caution in correlation with other  red cell parameters and the patient's clinical  condition.     02/25/2025 12:05 PM 32.6 32.0 - 36.0 g/dL Final     RDW   Date/Time Value Ref Range Status   04/22/2025 10:12 AM 13.1 11.5 - 14.5 % Final   04/09/2025 10:35 AM 12.9 11.5 - 14.5 % Final   03/25/2025 12:11 PM 11.8 11.0 - 15.0 % Final   02/25/2025 12:05 PM 13.1 11.5 - 14.5 % Final     Platelets   Date/Time Value Ref Range Status   04/22/2025 10:12 AM 71 (L) 150 - 450 x10*3/uL Final   04/09/2025 10:35 AM 88 (L) 150 - 450 x10*3/uL Final   02/25/2025 12:05 PM 98 (L) 150 - 450 x10*3/uL Final     PLATELET COUNT   Date/Time Value Ref Range Status   03/25/2025 12:11 PM 93 (L) 140 - 400 Thousand/uL Final     MPV   Date/Time Value Ref Range Status   03/25/2025 12:11 PM 12.4 7.5 - 12.5 fL Final     Neutrophils %   Date/Time Value Ref Range Status   04/22/2025 10:12 AM 73.2 40.0 - 80.0 % Final   04/09/2025 10:35 AM 78.3 40.0 - 80.0 % Final     Immature Granulocytes %, Automated   Date/Time Value Ref Range Status   04/22/2025 10:12 AM 1.2 (H) 0.0 - 0.9 % Final     Comment:     Immature Granulocyte Count (IG) includes promyelocytes, myelocytes and metamyelocytes but does not include bands. Percent  differential counts (%) should be interpreted in the context of the absolute cell counts (cells/UL).   04/09/2025 10:35 AM 0.8 0.0 - 0.9 % Final     Comment:     Immature Granulocyte Count (IG) includes promyelocytes, myelocytes and metamyelocytes but does not include bands. Percent differential counts (%) should be interpreted in the context of the absolute cell counts (cells/UL).     Lymphocytes %   Date/Time Value Ref Range Status   04/22/2025 10:12 AM 16.6 13.0 - 44.0 % Final   04/09/2025 10:35 AM 13.9 13.0 - 44.0 % Final     Monocytes %   Date/Time Value Ref Range Status   04/22/2025 10:12 AM 7.5 2.0 - 10.0 % Final   04/09/2025 10:35 AM 5.7 2.0 - 10.0 % Final     Eosinophils %   Date/Time Value Ref Range Status   04/22/2025 10:12 AM 1.0 0.0 - 6.0 % Final   04/09/2025 10:35 AM 1.0 0.0 - 6.0 % Final     Basophils %   Date/Time Value Ref Range Status   04/22/2025 10:12 AM 0.5 0.0 - 2.0 % Final   04/09/2025 10:35 AM 0.3 0.0 - 2.0 % Final     Neutrophils Absolute   Date/Time Value Ref Range Status   04/22/2025 10:12 AM 6.46 1.20 - 7.70 x10*3/uL Final     Comment:     Percent differential counts (%) should be interpreted in the context of the absolute cell counts (cells/uL).   04/09/2025 10:35 AM 7.98 (H) 1.20 - 7.70 x10*3/uL Final     Comment:     Percent differential counts (%) should be interpreted in the context of the absolute cell counts (cells/uL).     Immature Granulocytes Absolute, Automated   Date/Time Value Ref Range Status   04/22/2025 10:12 AM 0.11 0.00 - 0.70 x10*3/uL Final   04/09/2025 10:35 AM 0.08 0.00 - 0.70 x10*3/uL Final     Lymphocytes Absolute   Date/Time Value Ref Range Status   04/22/2025 10:12 AM 1.47 1.20 - 4.80 x10*3/uL Final   04/09/2025 10:35 AM 1.42 1.20 - 4.80 x10*3/uL Final     Monocytes Absolute   Date/Time Value Ref Range Status   04/22/2025 10:12 AM 0.66 0.10 - 1.00 x10*3/uL Final   04/09/2025 10:35 AM 0.58 0.10 - 1.00 x10*3/uL Final     Eosinophils Absolute   Date/Time Value Ref  "Range Status   04/22/2025 10:12 AM 0.09 0.00 - 0.70 x10*3/uL Final   04/09/2025 10:35 AM 0.10 0.00 - 0.70 x10*3/uL Final     Basophils Absolute   Date/Time Value Ref Range Status   04/22/2025 10:12 AM 0.04 0.00 - 0.10 x10*3/uL Final   04/09/2025 10:35 AM 0.03 0.00 - 0.10 x10*3/uL Final       No components found for: \"PT\"  No results found for: \"APTT\"  Medication Documentation Review Audit       Reviewed by Myriam Churchill MD (Physician) on 04/18/25 at 1439      Medication Order Taking? Sig Documenting Provider Last Dose Status   levothyroxine (Synthroid, Levoxyl) 50 mcg tablet [id number] Yes Take 1 tablet (50 mcg) by mouth once daily. Historical Provider, MD  Active   prenatal no115/iron/folic acid (PRENATAL 19 ORAL) [id number] Yes Take by mouth. Historical Provider, MD  Active                   Assessment/Plan    1) thrombocytopenia  -she used to live in [place] and also had her first child there; labwork was done regularly, even while pregnant, and she does not ever recall being told her platelet count was low  -she will check her medical records  -I reviewed her lab history  -10/21/2024 wbc 7.4, hgb 13.3, plt 150,000  -2/25/2025 wbc 9.0, hgb 12.7, plt 98,000 (done at [facility], collected 1205 PM, run 2131 PM)  -3/25/2025 wbc 8.9, hgb 13.6, plt 93,000, collected 1211 PM, run 6 days later at 1635 PM on 3/31/2025 (she says specimen was lost, then found)  -unclear if she may have autoimmune ITP at baseline-- in which case her plt count will be low at baseline, and get worse as pregnancy progresses  -gestational thrombocytopenia appears only during pregnancy, never dips below 100,000, and resolves after delivery  -will check CBC/diff freshly in office and also review peripheral smear  -wbc 10.2, hgb 13.5, plt 88,000, ANC 7980  -likely has ITP--and may need steroids in last portion of 3rd trimester  -will recheck CBC in 2 weeks  -here for interval followup via virtual/telehealth modality  -she was able to look up old " lab results  -2019 (not pregnant) platelets 135,000  -2021 (pregnant) platelets 125,000, then 1 month before delivery platelets 88,000 (she was told thee was clumping noted); however, delivery complicated by rectus sheath hematoma on post op day 2 (hgb down to 5)  -MANAS was 5/15, now moved up to  for planned   -CBC checked on 2025 reviewed--wbc 8.8, hgb 12.8, plt 71,000  -given her history of thrombocytopenia at baseline, that then is worsening as pregnancy progresses, this appears consistent with ITP  -as her due date is now approaching and she has had prior bleeding complication postpartum, will start her on steroids  -she will start with 50 mg PO x 3 days, then go down to 40 mg x 3 days  -will recheck CBC in 1 week     2) active pregnancy  -currently 35 weeks pregnant  -EDC 5/15/2025     3) hypothyroidism  -on levothyroxine       Problem List Items Addressed This Visit           ICD-10-CM    Thrombocytopenia (CMS-HCC) D69.6    Relevant Orders    Clinic Appointment Request Virtual Est; LEONOR SHANNON MD                              [1] No family history on file.

## 2025-04-23 NOTE — TELEPHONE ENCOUNTER
Patient left message on ob line stating that after speaking with her , would like to proceed with getting  scheduled on  with Dr Churchill, if there is still a spot open.  Dr Churchill had mentioned she could do  during admin time, in open spot available.    Patient is scheduled to see Dr Robles on  for her next ob visit and will discuss more at visit.    Message forwarded to Dr Robles and CELIO

## 2025-04-24 PROBLEM — D69.3 IDIOPATHIC THROMBOCYTOPENIC PURPURA (MULTI): Status: ACTIVE | Noted: 2025-04-24

## 2025-04-24 PROBLEM — Z3A.37 37 WEEKS GESTATION OF PREGNANCY (HHS-HCC): Status: ACTIVE | Noted: 2024-09-10

## 2025-04-24 PROBLEM — Z3A.39 39 WEEKS GESTATION OF PREGNANCY (HHS-HCC): Status: RESOLVED | Noted: 2025-04-23 | Resolved: 2025-04-24

## 2025-04-24 ASSESSMENT — ENCOUNTER SYMPTOMS
ENDOCRINE NEGATIVE: 1
HEMATOLOGIC/LYMPHATIC NEGATIVE: 1
GASTROINTESTINAL NEGATIVE: 1
RESPIRATORY NEGATIVE: 1
MUSCULOSKELETAL NEGATIVE: 1
EYES NEGATIVE: 1
NEUROLOGICAL NEGATIVE: 1
PSYCHIATRIC NEGATIVE: 1
CARDIOVASCULAR NEGATIVE: 1
CONSTITUTIONAL NEGATIVE: 1

## 2025-04-24 NOTE — PROGRESS NOTES
Subjective   Patient ID [id number]   Melissa German is a 29 y.o.  at 37w1d with a working estimated date of delivery of 5/15/2025, by Last Menstrual Period who presents for a routine prenatal visit. Good FM, no OB complaints. On steroid taper for ITP    Objective   Physical Exam  Vitals:    25 1425   BP: 130/78      Weight: 87.7 kg (193 lb 6.4 oz), Pregravid BMI: 24.76  Expected Total Weight Gain: 11.5 kg (25 lb)-16 kg (35 lb)   Fundal height and FHR per prenatal flowsheet    Assessment/Plan     Melissa German is a 29 y.o.  at 37w1d with a working estimated date of delivery of 5/15/2025, by Last Menstrual Period who presents for a routine prenatal visit.    ITP with platelets 71K on . On steroid taper per heme with plan for repeat CBC on . Discussed anesthesia threshold for spinal and possible need for general anesthesia if platelets do not respond to steroids. Discussed both maternal and fetal risk of general anesthesia as well as possible though unlikely recommendation for delivery at [facility] pending next CBC. Currently scheduled for rCS on  at [facility] though may want to move to , going to discuss with her family and let us know. Declines BTL, desires additional pregnancies. Remainder of plan per problem list as below.     Problem List Items Addressed This Visit       Thrombocytopenia (CMS-HCC) - Primary    Overview   - platelets 98 with glucola  - platelets 71 on   - started on steroid taper   <> repeat CBC in 1 week  <> seeing Daniel Gale - likely ITP          History of  section    Overview   - c/s for breech at 39 weeks in [place] - unable to get operative report but no reason to suspect incision other than low transverse   - likely large rectus sheath hematoma (1.5L) for which there was a delay in recognition - pt had hemoglobin of 5 ultimately when checked   - MFMU 72.7%  - Desires repeat          Fetal macrosomia affecting management of mother,  antepartum ([facility])    Overview   - 36 week growth USN - EFW 3700 grams (>99%) with AC >99%           37 weeks gestation of pregnancy ([facility])    Overview   Desired provider in labor: [x] CNM  [] Physician  [x] Blood Products: [x] Yes, accepts [] No, needs counseling  [x] Initial BMI: 24.76   [x] Prenatal Labs:   [x] Cervical Cancer Screening up to date: 4/2024 - WNL (records scanned in)  [x] Rh status: O pos  [x] Genetic Screening:  RR NIPS, it's a BOY   [x] NT US: (11-13 wks): WNL  [x] Pregnancy dated by:  LMP c/w 7 week in-office USN     [x] Anatomy US: (19-20 wks): WNL other than low-lying placenta   [] Federal Sterilization consent signed (if indicated):  [x] 1hr GCT at 24-28wks WNL  [] Fetal Surveillance (if indicated):  [x] Tdap: Declines   [x] Flu Vaccine: Declines   [x] Updated COVID vaccine recommended     [x] Breastfeeding: plans to breast feed - has pump ordered   [] Postpartum Birth control method:   [x] GBS at 36 - 37 wks: negative  [x] 39 weeks discussion of IOL vs. Expectant management: 39 weeks  [x] Mode of delivery ( anticipated ): rCS scheduled for 39w4d on 5/12 at 8:30 with Kerline           Follow up in 1 weeks for a routine prenatal visit.    Elyse Robles MD

## 2025-04-25 ENCOUNTER — APPOINTMENT (OUTPATIENT)
Dept: OBSTETRICS AND GYNECOLOGY | Facility: CLINIC | Age: 30
End: 2025-04-25
Payer: COMMERCIAL

## 2025-04-25 VITALS — WEIGHT: 193.4 LBS | BODY MASS INDEX: 32.18 KG/M2 | SYSTOLIC BLOOD PRESSURE: 130 MMHG | DIASTOLIC BLOOD PRESSURE: 78 MMHG

## 2025-04-25 DIAGNOSIS — D69.6 THROMBOCYTOPENIA (CMS-HCC): Primary | ICD-10-CM

## 2025-04-25 DIAGNOSIS — Z3A.37 37 WEEKS GESTATION OF PREGNANCY (HHS-HCC): ICD-10-CM

## 2025-04-25 DIAGNOSIS — Z98.891 HISTORY OF CESAREAN SECTION: ICD-10-CM

## 2025-04-25 DIAGNOSIS — O36.60X0 FETAL MACROSOMIA AFFECTING MANAGEMENT OF MOTHER, ANTEPARTUM (HHS-HCC): ICD-10-CM

## 2025-04-25 PROCEDURE — 0501F PRENATAL FLOW SHEET: CPT | Performed by: STUDENT IN AN ORGANIZED HEALTH CARE EDUCATION/TRAINING PROGRAM

## 2025-04-29 ENCOUNTER — APPOINTMENT (OUTPATIENT)
Dept: OBSTETRICS AND GYNECOLOGY | Facility: CLINIC | Age: 30
End: 2025-04-29
Payer: COMMERCIAL

## 2025-04-29 VITALS — DIASTOLIC BLOOD PRESSURE: 78 MMHG | SYSTOLIC BLOOD PRESSURE: 126 MMHG | BODY MASS INDEX: 32.35 KG/M2 | WEIGHT: 194.4 LBS

## 2025-04-29 DIAGNOSIS — D69.6 THROMBOCYTOPENIA (CMS-HCC): ICD-10-CM

## 2025-04-29 DIAGNOSIS — Z98.891 HISTORY OF CESAREAN SECTION: ICD-10-CM

## 2025-04-29 DIAGNOSIS — Z3A.37 37 WEEKS GESTATION OF PREGNANCY (HHS-HCC): Primary | ICD-10-CM

## 2025-04-29 DIAGNOSIS — O36.60X0 FETAL MACROSOMIA AFFECTING MANAGEMENT OF MOTHER, ANTEPARTUM (HHS-HCC): ICD-10-CM

## 2025-04-29 DIAGNOSIS — O44.40 LOW-LYING PLACENTA (HHS-HCC): ICD-10-CM

## 2025-04-29 PROCEDURE — 0501F PRENATAL FLOW SHEET: CPT | Performed by: OBSTETRICS & GYNECOLOGY

## 2025-04-29 NOTE — PROGRESS NOTES
Patient c/o round ligament pain in abdomen. Also, vaginal pressure.    Routine prenatal visit     Subjective    HPI:  Pt feeling well in general.  Has RCS scheduled on  - feels comfortable with this plan.  Has follow up CBC planned for tomorrow morning.  Did get prescribed a steroid taper for ITP per Dr. Gale.  Will discuss case with anesthesia team once platelets are back tomorrow.      Objective    Vital Signs  /78   Wt 88.2 kg (194 lb 6.4 oz)   LMP 2024 (Exact Date)   BMI 32.35 kg/m²     Melissa German is a 29 y.o. yo  at 37w5d here for the following concerns which we addressed today:     Medical Problems       Problem List       37 weeks gestation of pregnancy ([facility])    Overview Addendum 2025  2:47 PM by Elyse Robles MD   Desired provider in labor: [x] CNM  [] Physician  [x] Blood Products: [x] Yes, accepts [] No, needs counseling  [x] Initial BMI: 24.76   [x] Prenatal Labs:   [x] Cervical Cancer Screening up to date: 2024 - WNL (records scanned in)  [x] Rh status: O pos  [x] Genetic Screening:  RR NIPS, it's a BOY   [x] NT US: (11-13 wks): WNL  [x] Pregnancy dated by:  LMP c/w 7 week in-office USN     [x] Anatomy US: (19-20 wks): WNL other than low-lying placenta   [x] Federal Sterilization consent signed (if indicated): declines  [x] 1hr GCT at 24-28wks WNL  [] Fetal Surveillance (if indicated):  [x] Tdap: Declines   [x] Flu Vaccine: Declines   [x] Updated COVID vaccine recommended     [x] Breastfeeding: plans to breast feed - has pump ordered   [] Postpartum Birth control method:   [x] GBS at 36 - 37 wks: negative  [x] 39 weeks discussion of IOL vs. Expectant management: 39 weeks  [x] Mode of delivery ( anticipated ): rCS scheduled for 39w4d on  at 8:30 with [place]          History of  section    Overview Addendum 2025  6:13 PM by Elyse Robles MD   - c/s for breech at 39 weeks in [place] - unable to get operative report but no reason to suspect  incision other than low transverse   - likely large rectus sheath hematoma (1.5L) for which there was a delay in recognition - pt had hemoglobin of 5 ultimately when checked   - MFMU 72.7%  - Desires repeat          Hypothyroidism    Overview Addendum 2025  5:25 PM by Myriam Churchill MD   - on synthroid 50mcg daily          Low-lying placenta ([facility])    Overview Addendum 3/7/2025 12:13 PM by Myriam Churchill MD   - 1.2 cm from os on anatomy USN   --> RESOLVED at 30 week USN            Thrombocytopenia (CMS-HCC)    Overview Addendum 2025  2:48 PM by Elyse Robles MD   - platelets 98 with glucola  - platelets 71 on   - started on steroid taper   <> repeat CBC to be drawn   <> seeing Daniel Gale - likely ITP          Fetal macrosomia affecting management of mother, antepartum ([facility])    Overview Signed 2025  2:41 PM by Myriam Churchill MD   - 36 week growth USN - EFW 3700 grams (>99%) with AC >99%           Idiopathic thrombocytopenic purpura ([facility])        Follow up in 1 week(s).

## 2025-04-30 ENCOUNTER — LAB (OUTPATIENT)
Dept: LAB | Facility: CLINIC | Age: 30
End: 2025-04-30
Payer: COMMERCIAL

## 2025-04-30 ENCOUNTER — TELEMEDICINE (OUTPATIENT)
Dept: HEMATOLOGY/ONCOLOGY | Facility: CLINIC | Age: 30
End: 2025-04-30
Payer: COMMERCIAL

## 2025-04-30 DIAGNOSIS — Z3A.37 37 WEEKS GESTATION OF PREGNANCY (HHS-HCC): ICD-10-CM

## 2025-04-30 DIAGNOSIS — D69.3 IDIOPATHIC THROMBOCYTOPENIC PURPURA (MULTI): ICD-10-CM

## 2025-04-30 DIAGNOSIS — D69.3 IDIOPATHIC THROMBOCYTOPENIC PURPURA (MULTI): Primary | ICD-10-CM

## 2025-04-30 DIAGNOSIS — E03.9 HYPOTHYROIDISM, UNSPECIFIED TYPE: ICD-10-CM

## 2025-04-30 LAB
BASOPHILS # BLD AUTO: 0.05 X10*3/UL (ref 0–0.1)
BASOPHILS NFR BLD AUTO: 0.5 %
EOSINOPHIL # BLD AUTO: 0.08 X10*3/UL (ref 0–0.7)
EOSINOPHIL NFR BLD AUTO: 0.8 %
ERYTHROCYTE [DISTWIDTH] IN BLOOD BY AUTOMATED COUNT: 13.1 % (ref 11.5–14.5)
HCT VFR BLD AUTO: 37.5 % (ref 36–46)
HGB BLD-MCNC: 12.3 G/DL (ref 12–16)
IMM GRANULOCYTES # BLD AUTO: 0.12 X10*3/UL (ref 0–0.7)
IMM GRANULOCYTES NFR BLD AUTO: 1.3 % (ref 0–0.9)
LYMPHOCYTES # BLD AUTO: 2.14 X10*3/UL (ref 1.2–4.8)
LYMPHOCYTES NFR BLD AUTO: 22.6 %
MCH RBC QN AUTO: 28.1 PG (ref 26–34)
MCHC RBC AUTO-ENTMCNC: 32.8 G/DL (ref 32–36)
MCV RBC AUTO: 86 FL (ref 80–100)
MONOCYTES # BLD AUTO: 0.48 X10*3/UL (ref 0.1–1)
MONOCYTES NFR BLD AUTO: 5.1 %
NEUTROPHILS # BLD AUTO: 6.59 X10*3/UL (ref 1.2–7.7)
NEUTROPHILS NFR BLD AUTO: 69.7 %
PLATELET # BLD AUTO: 79 X10*3/UL (ref 150–450)
RBC # BLD AUTO: 4.37 X10*6/UL (ref 4–5.2)
WBC # BLD AUTO: 9.5 X10*3/UL (ref 4.4–11.3)

## 2025-04-30 PROCEDURE — 99213 OFFICE O/P EST LOW 20 MIN: CPT | Performed by: INTERNAL MEDICINE

## 2025-04-30 PROCEDURE — 36415 COLL VENOUS BLD VENIPUNCTURE: CPT

## 2025-04-30 PROCEDURE — 85025 COMPLETE CBC W/AUTO DIFF WBC: CPT

## 2025-04-30 NOTE — PROGRESS NOTES
Patient ID: Melissa German is a 29 y.o. female.  Referring Physician: No referring provider defined for this encounter.  Primary Care Provider: No primary care provider on file.  Visit Type:  Follow Up     Virtual or Telephone Consent    An interactive audio and video telecommunication system which permits real time communications between the patient (at the originating site) and provider (at the distant site) was utilized to provide this telehealth service.   Verbal consent was requested and obtained from Melissa German on this date, 25 for a telehealth visit and the patient's location was confirmed at the time of the visit.     Subjective    HPI How was my platelet count?    Review of Systems   Constitutional: Negative.    HENT:  Negative.     Eyes: Negative.    Respiratory: Negative.     Cardiovascular: Negative.    Gastrointestinal: Negative.    Endocrine: Negative.    Genitourinary: Negative.     Musculoskeletal: Negative.    Skin: Negative.    Neurological: Negative.    Hematological: Negative.    Psychiatric/Behavioral: Negative.          Objective   BSA: There is no height or weight on file to calculate BSA.  LMP 2024 (Exact Date)      has no past medical history on file.   has a past surgical history that includes  section, low transverse and [place] tooth extraction (Bilateral).  Family History[1]  Oncology History    No history exists.       Melissa German  reports that she has never smoked. She has never used smokeless tobacco.  She  reports no history of alcohol use.  She  reports no history of drug use.    Physical Exam    WBC   Date/Time Value Ref Range Status   2025 07:35 AM 9.5 4.4 - 11.3 x10*3/uL Final   2025 10:12 AM 8.8 4.4 - 11.3 x10*3/uL Final   2025 10:35 AM 10.2 4.4 - 11.3 x10*3/uL Final     WHITE BLOOD CELL COUNT   Date/Time Value Ref Range Status   2025 12:11 PM 8.9 3.8 - 10.8 Thousand/uL Final     nRBC   Date Value Ref Range Status   2025  0.0 0.0 - 0.0 /100 WBCs Final   10/21/2024 0.0 0.0 - 0.0 /100 WBCs Final     RBC   Date Value Ref Range Status   04/30/2025 4.37 4.00 - 5.20 x10*6/uL Final   04/22/2025 4.49 4.00 - 5.20 x10*6/uL Final   04/09/2025 4.85 4.00 - 5.20 x10*6/uL Final     RED BLOOD CELL COUNT   Date Value Ref Range Status   03/25/2025 4.85 3.80 - 5.10 Million/uL Final     Hemoglobin   Date Value Ref Range Status   04/30/2025 12.3 12.0 - 16.0 g/dL Final   04/22/2025 12.8 12.0 - 16.0 g/dL Final   04/09/2025 13.5 12.0 - 16.0 g/dL Final     HEMOGLOBIN   Date Value Ref Range Status   03/25/2025 13.6 11.7 - 15.5 g/dL Final     Hematocrit   Date Value Ref Range Status   04/30/2025 37.5 36.0 - 46.0 % Final   04/22/2025 38.9 36.0 - 46.0 % Final   04/09/2025 41.4 36.0 - 46.0 % Final     HEMATOCRIT   Date Value Ref Range Status   03/25/2025 41.3 35.0 - 45.0 % Final     MCV   Date/Time Value Ref Range Status   04/30/2025 07:35 AM 86 80 - 100 fL Final   04/22/2025 10:12 AM 87 80 - 100 fL Final   04/09/2025 10:35 AM 85 80 - 100 fL Final   03/25/2025 12:11 PM 85.2 80.0 - 100.0 fL Final     MCH   Date/Time Value Ref Range Status   04/30/2025 07:35 AM 28.1 26.0 - 34.0 pg Final   04/22/2025 10:12 AM 28.5 26.0 - 34.0 pg Final   04/09/2025 10:35 AM 27.8 26.0 - 34.0 pg Final   03/25/2025 12:11 PM 28.0 27.0 - 33.0 pg Final     MCHC   Date/Time Value Ref Range Status   04/30/2025 07:35 AM 32.8 32.0 - 36.0 g/dL Final   04/22/2025 10:12 AM 32.9 32.0 - 36.0 g/dL Final   04/09/2025 10:35 AM 32.6 32.0 - 36.0 g/dL Final   03/25/2025 12:11 PM 32.9 32.0 - 36.0 g/dL Final     Comment:     For adults, a slight decrease in the calculated MCHC  value (in the range of 30 to 32 g/dL) is most likely  not clinically significant; however, it should be  interpreted with caution in correlation with other  red cell parameters and the patient's clinical  condition.       RDW   Date/Time Value Ref Range Status   04/30/2025 07:35 AM 13.1 11.5 - 14.5 % Final   04/22/2025 10:12 AM 13.1  11.5 - 14.5 % Final   04/09/2025 10:35 AM 12.9 11.5 - 14.5 % Final   03/25/2025 12:11 PM 11.8 11.0 - 15.0 % Final     Platelets   Date/Time Value Ref Range Status   04/30/2025 07:35 AM 79 (L) 150 - 450 x10*3/uL Final   04/22/2025 10:12 AM 71 (L) 150 - 450 x10*3/uL Final   04/09/2025 10:35 AM 88 (L) 150 - 450 x10*3/uL Final     PLATELET COUNT   Date/Time Value Ref Range Status   03/25/2025 12:11 PM 93 (L) 140 - 400 Thousand/uL Final     MPV   Date/Time Value Ref Range Status   03/25/2025 12:11 PM 12.4 7.5 - 12.5 fL Final     Neutrophils %   Date/Time Value Ref Range Status   04/30/2025 07:35 AM 69.7 40.0 - 80.0 % Final   04/22/2025 10:12 AM 73.2 40.0 - 80.0 % Final   04/09/2025 10:35 AM 78.3 40.0 - 80.0 % Final     Immature Granulocytes %, Automated   Date/Time Value Ref Range Status   04/30/2025 07:35 AM 1.3 (H) 0.0 - 0.9 % Final     Comment:     Immature Granulocyte Count (IG) includes promyelocytes, myelocytes and metamyelocytes but does not include bands. Percent differential counts (%) should be interpreted in the context of the absolute cell counts (cells/UL).   04/22/2025 10:12 AM 1.2 (H) 0.0 - 0.9 % Final     Comment:     Immature Granulocyte Count (IG) includes promyelocytes, myelocytes and metamyelocytes but does not include bands. Percent differential counts (%) should be interpreted in the context of the absolute cell counts (cells/UL).   04/09/2025 10:35 AM 0.8 0.0 - 0.9 % Final     Comment:     Immature Granulocyte Count (IG) includes promyelocytes, myelocytes and metamyelocytes but does not include bands. Percent differential counts (%) should be interpreted in the context of the absolute cell counts (cells/UL).     Lymphocytes %   Date/Time Value Ref Range Status   04/30/2025 07:35 AM 22.6 13.0 - 44.0 % Final   04/22/2025 10:12 AM 16.6 13.0 - 44.0 % Final   04/09/2025 10:35 AM 13.9 13.0 - 44.0 % Final     Monocytes %   Date/Time Value Ref Range Status   04/30/2025 07:35 AM 5.1 2.0 - 10.0 % Final    04/22/2025 10:12 AM 7.5 2.0 - 10.0 % Final   04/09/2025 10:35 AM 5.7 2.0 - 10.0 % Final     Eosinophils %   Date/Time Value Ref Range Status   04/30/2025 07:35 AM 0.8 0.0 - 6.0 % Final   04/22/2025 10:12 AM 1.0 0.0 - 6.0 % Final   04/09/2025 10:35 AM 1.0 0.0 - 6.0 % Final     Basophils %   Date/Time Value Ref Range Status   04/30/2025 07:35 AM 0.5 0.0 - 2.0 % Final   04/22/2025 10:12 AM 0.5 0.0 - 2.0 % Final   04/09/2025 10:35 AM 0.3 0.0 - 2.0 % Final     Neutrophils Absolute   Date/Time Value Ref Range Status   04/30/2025 07:35 AM 6.59 1.20 - 7.70 x10*3/uL Final     Comment:     Percent differential counts (%) should be interpreted in the context of the absolute cell counts (cells/uL).   04/22/2025 10:12 AM 6.46 1.20 - 7.70 x10*3/uL Final     Comment:     Percent differential counts (%) should be interpreted in the context of the absolute cell counts (cells/uL).   04/09/2025 10:35 AM 7.98 (H) 1.20 - 7.70 x10*3/uL Final     Comment:     Percent differential counts (%) should be interpreted in the context of the absolute cell counts (cells/uL).     Immature Granulocytes Absolute, Automated   Date/Time Value Ref Range Status   04/30/2025 07:35 AM 0.12 0.00 - 0.70 x10*3/uL Final   04/22/2025 10:12 AM 0.11 0.00 - 0.70 x10*3/uL Final   04/09/2025 10:35 AM 0.08 0.00 - 0.70 x10*3/uL Final     Lymphocytes Absolute   Date/Time Value Ref Range Status   04/30/2025 07:35 AM 2.14 1.20 - 4.80 x10*3/uL Final   04/22/2025 10:12 AM 1.47 1.20 - 4.80 x10*3/uL Final   04/09/2025 10:35 AM 1.42 1.20 - 4.80 x10*3/uL Final     Monocytes Absolute   Date/Time Value Ref Range Status   04/30/2025 07:35 AM 0.48 0.10 - 1.00 x10*3/uL Final   04/22/2025 10:12 AM 0.66 0.10 - 1.00 x10*3/uL Final   04/09/2025 10:35 AM 0.58 0.10 - 1.00 x10*3/uL Final     Eosinophils Absolute   Date/Time Value Ref Range Status   04/30/2025 07:35 AM 0.08 0.00 - 0.70 x10*3/uL Final   04/22/2025 10:12 AM 0.09 0.00 - 0.70 x10*3/uL Final   04/09/2025 10:35 AM 0.10 0.00 -  "0.70 x10*3/uL Final     Basophils Absolute   Date/Time Value Ref Range Status   2025 07:35 AM 0.05 0.00 - 0.10 x10*3/uL Final   2025 10:12 AM 0.04 0.00 - 0.10 x10*3/uL Final   2025 10:35 AM 0.03 0.00 - 0.10 x10*3/uL Final       No components found for: \"PT\"  No results found for: \"APTT\"    Assessment/Plan    1) thrombocytopenia  -she used to live in [place] and also had her first child there; labwork was done regularly, even while pregnant, and she does not ever recall being told her platelet count was low  -she will check her medical records  -I reviewed her lab history  -10/21/2024 wbc 7.4, hgb 13.3, plt 150,000  -2025 wbc 9.0, hgb 12.7, plt 98,000 (done at [facility], collected 1205 PM, run 2131 PM)  -3/25/2025 wbc 8.9, hgb 13.6, plt 93,000, collected 1211 PM, run 6 days later at 1635 PM on 3/31/2025 (she says specimen was lost, then found)  -unclear if she may have autoimmune ITP at baseline-- in which case her plt count will be low at baseline, and get worse as pregnancy progresses  -gestational thrombocytopenia appears only during pregnancy, never dips below 100,000, and resolves after delivery  -will check CBC/diff freshly in office and also review peripheral smear  -wbc 10.2, hgb 13.5, plt 88,000, ANC 7980  -likely has ITP--and may need steroids in last portion of 3rd trimester  -will recheck CBC in 2 weeks  -here for interval followup via virtual/telehealth modality  -she was able to look up old lab results  -2019 (not pregnant) platelets 135,000  -2021 (pregnant) platelets 125,000, then 1 month before delivery platelets 88,000 (she was told thee was clumping noted); however, delivery complicated by rectus sheath hematoma on post op day 2 (hgb down to 5)  -MANAS was 5/15, now moved up to  for planned   -CBC checked on 2025 reviewed--wbc 8.8, hgb 12.8, plt 71,000  -given her history of thrombocytopenia at baseline, that then is worsening as pregnancy progresses, this " appears consistent with ITP  -as her due date is now approaching and she has had prior bleeding complication postpartum, will start her on steroids  -she will start with 50 mg PO x 3 days, then go down to 40 mg x 3 days  -will recheck CBC in 1 week  -here for interval followup via virtual/telehealth modality  -has been taking prednisone uneventfully so far  -CBC done this morning showed wbc 9.5, hgb 12.3, plt 79,000  -advised her to continue with prednisone 40 mg daily  -ADDENDUM- discussed with Dr Churchill--she discussed with anesthesia and they are comfortable with platelet count holding at least 70K to proceed with   -will now dial up prednisone to standard 1 mg/kg PO daily, to see if her platelet count can be boosted up any higher in order to minimize bleeding risk a/w surgery  -she will go up to 80 mg daily starting   -will recheck CBC again on   - is now booked for      2) active pregnancy  -currently 35 weeks pregnant  -EDC 5/15/2025     3) hypothyroidism  -on levothyroxine          Problem List Items Addressed This Visit    None           Daniel Gale MD                                [1] No family history on file.

## 2025-04-30 NOTE — PATIENT INSTRUCTIONS
"I am awaiting input from Dr Churchill as to the \"comfortable platelet level\" she and anesthesia would prefer for C/Section  "

## 2025-05-01 ASSESSMENT — ENCOUNTER SYMPTOMS
ENDOCRINE NEGATIVE: 1
NEUROLOGICAL NEGATIVE: 1
CONSTITUTIONAL NEGATIVE: 1
CARDIOVASCULAR NEGATIVE: 1
HEMATOLOGIC/LYMPHATIC NEGATIVE: 1
PSYCHIATRIC NEGATIVE: 1
MUSCULOSKELETAL NEGATIVE: 1
GASTROINTESTINAL NEGATIVE: 1
EYES NEGATIVE: 1
RESPIRATORY NEGATIVE: 1

## 2025-05-02 ENCOUNTER — APPOINTMENT (OUTPATIENT)
Dept: OBSTETRICS AND GYNECOLOGY | Facility: CLINIC | Age: 30
End: 2025-05-02
Payer: COMMERCIAL

## 2025-05-06 ENCOUNTER — TELEMEDICINE (OUTPATIENT)
Dept: HEMATOLOGY/ONCOLOGY | Facility: CLINIC | Age: 30
End: 2025-05-06
Payer: COMMERCIAL

## 2025-05-06 ENCOUNTER — LAB (OUTPATIENT)
Dept: LAB | Facility: CLINIC | Age: 30
End: 2025-05-06
Payer: COMMERCIAL

## 2025-05-06 DIAGNOSIS — Z3A.39 39 WEEKS GESTATION OF PREGNANCY (HHS-HCC): Primary | ICD-10-CM

## 2025-05-06 DIAGNOSIS — E03.9 HYPOTHYROIDISM, UNSPECIFIED TYPE: ICD-10-CM

## 2025-05-06 DIAGNOSIS — D69.3 IDIOPATHIC THROMBOCYTOPENIC PURPURA (MULTI): ICD-10-CM

## 2025-05-06 DIAGNOSIS — D69.6 THROMBOCYTOPENIA (CMS-HCC): Primary | ICD-10-CM

## 2025-05-06 LAB
BASOPHILS # BLD AUTO: 0.07 X10*3/UL (ref 0–0.1)
BASOPHILS NFR BLD AUTO: 0.6 %
EOSINOPHIL # BLD AUTO: 0.06 X10*3/UL (ref 0–0.7)
EOSINOPHIL NFR BLD AUTO: 0.5 %
ERYTHROCYTE [DISTWIDTH] IN BLOOD BY AUTOMATED COUNT: 13.1 % (ref 11.5–14.5)
HCT VFR BLD AUTO: 36.8 % (ref 36–46)
HGB BLD-MCNC: 12.1 G/DL (ref 12–16)
HOLD SPECIMEN: 293
IMM GRANULOCYTES # BLD AUTO: 0.21 X10*3/UL (ref 0–0.7)
IMM GRANULOCYTES NFR BLD AUTO: 1.8 % (ref 0–0.9)
LYMPHOCYTES # BLD AUTO: 1.82 X10*3/UL (ref 1.2–4.8)
LYMPHOCYTES NFR BLD AUTO: 15.7 %
MCH RBC QN AUTO: 28 PG (ref 26–34)
MCHC RBC AUTO-ENTMCNC: 32.9 G/DL (ref 32–36)
MCV RBC AUTO: 85 FL (ref 80–100)
MONOCYTES # BLD AUTO: 0.64 X10*3/UL (ref 0.1–1)
MONOCYTES NFR BLD AUTO: 5.5 %
NEUTROPHILS # BLD AUTO: 8.78 X10*3/UL (ref 1.2–7.7)
NEUTROPHILS NFR BLD AUTO: 75.9 %
NRBC BLD-RTO: ABNORMAL /100{WBCS}
PLATELET # BLD AUTO: 82 X10*3/UL (ref 150–450)
RBC # BLD AUTO: 4.32 X10*6/UL (ref 4–5.2)
WBC # BLD AUTO: 11.6 X10*3/UL (ref 4.4–11.3)

## 2025-05-06 PROCEDURE — 99213 OFFICE O/P EST LOW 20 MIN: CPT | Performed by: INTERNAL MEDICINE

## 2025-05-06 PROCEDURE — 1036F TOBACCO NON-USER: CPT | Performed by: INTERNAL MEDICINE

## 2025-05-06 PROCEDURE — 85025 COMPLETE CBC W/AUTO DIFF WBC: CPT | Performed by: INTERNAL MEDICINE

## 2025-05-06 NOTE — PROGRESS NOTES
Awaiting test results     Thomas James RN  08/23/23 1500 Patient ID: Melissa German is a 30 y.o. female.  Referring Physician: Daniel Gale MD  [id number]  [place] [address]  Primary Care Provider: No primary care provider on file.  Visit Type:  Follow Up     Virtual or Telephone Consent    An interactive audio and video telecommunication system which permits real time communications between the patient (at the originating site) and provider (at the distant site) was utilized to provide this telehealth service.   Verbal consent was requested and obtained from Meilssa German on this date, 25 for a telehealth visit and the patient's location was confirmed at the time of the visit.     Subjective    HPI The higher dose of prednisone gives me some insomnia    Review of Systems   Constitutional: Negative.    HENT:  Negative.     Eyes: Negative.    Respiratory: Negative.     Cardiovascular: Negative.    Gastrointestinal: Negative.    Endocrine: Negative.    Genitourinary: Negative.     Musculoskeletal: Negative.    Skin: Negative.    Neurological: Negative.    Hematological: Negative.    Psychiatric/Behavioral:  Positive for sleep disturbance.         Objective   BSA: There is no height or weight on file to calculate BSA.  LMP 2024 (Exact Date)      has no past medical history on file.   has a past surgical history that includes  section, low transverse and [place] tooth extraction (Bilateral).  Family History[1]  Oncology History    No history exists.       Melissa German  reports that she has never smoked. She has never used smokeless tobacco.  She  reports no history of alcohol use.  She  reports no history of drug use.    Physical Exam    WBC   Date/Time Value Ref Range Status   2025 07:57 AM 11.6 (H) 4.4 - 11.3 x10*3/uL Final   2025 07:35 AM 9.5 4.4 - 11.3 x10*3/uL Final   2025 10:12 AM 8.8 4.4 - 11.3 x10*3/uL Final     WHITE BLOOD CELL COUNT   Date/Time Value Ref Range Status   2025 12:11 PM 8.9 3.8 -  10.8 Thousand/uL Final     nRBC   Date Value Ref Range Status   05/06/2025   Final     Comment:     Not Measured   02/25/2025 0.0 0.0 - 0.0 /100 WBCs Final   10/21/2024 0.0 0.0 - 0.0 /100 WBCs Final     RBC   Date Value Ref Range Status   05/06/2025 4.32 4.00 - 5.20 x10*6/uL Final   04/30/2025 4.37 4.00 - 5.20 x10*6/uL Final   04/22/2025 4.49 4.00 - 5.20 x10*6/uL Final     RED BLOOD CELL COUNT   Date Value Ref Range Status   03/25/2025 4.85 3.80 - 5.10 Million/uL Final     Hemoglobin   Date Value Ref Range Status   05/06/2025 12.1 12.0 - 16.0 g/dL Final   04/30/2025 12.3 12.0 - 16.0 g/dL Final   04/22/2025 12.8 12.0 - 16.0 g/dL Final     HEMOGLOBIN   Date Value Ref Range Status   03/25/2025 13.6 11.7 - 15.5 g/dL Final     Hematocrit   Date Value Ref Range Status   05/06/2025 36.8 36.0 - 46.0 % Final   04/30/2025 37.5 36.0 - 46.0 % Final   04/22/2025 38.9 36.0 - 46.0 % Final     HEMATOCRIT   Date Value Ref Range Status   03/25/2025 41.3 35.0 - 45.0 % Final     MCV   Date/Time Value Ref Range Status   05/06/2025 07:57 AM 85 80 - 100 fL Final   04/30/2025 07:35 AM 86 80 - 100 fL Final   04/22/2025 10:12 AM 87 80 - 100 fL Final   03/25/2025 12:11 PM 85.2 80.0 - 100.0 fL Final     MCH   Date/Time Value Ref Range Status   05/06/2025 07:57 AM 28.0 26.0 - 34.0 pg Final   04/30/2025 07:35 AM 28.1 26.0 - 34.0 pg Final   04/22/2025 10:12 AM 28.5 26.0 - 34.0 pg Final   03/25/2025 12:11 PM 28.0 27.0 - 33.0 pg Final     MCHC   Date/Time Value Ref Range Status   05/06/2025 07:57 AM 32.9 32.0 - 36.0 g/dL Final   04/30/2025 07:35 AM 32.8 32.0 - 36.0 g/dL Final   04/22/2025 10:12 AM 32.9 32.0 - 36.0 g/dL Final   03/25/2025 12:11 PM 32.9 32.0 - 36.0 g/dL Final     Comment:     For adults, a slight decrease in the calculated MCHC  value (in the range of 30 to 32 g/dL) is most likely  not clinically significant; however, it should be  interpreted with caution in correlation with other  red cell parameters and the patient's  clinical  condition.       RDW   Date/Time Value Ref Range Status   05/06/2025 07:57 AM 13.1 11.5 - 14.5 % Final   04/30/2025 07:35 AM 13.1 11.5 - 14.5 % Final   04/22/2025 10:12 AM 13.1 11.5 - 14.5 % Final   03/25/2025 12:11 PM 11.8 11.0 - 15.0 % Final     Platelets   Date/Time Value Ref Range Status   05/06/2025 07:57 AM 82 (L) 150 - 450 x10*3/uL Final   04/30/2025 07:35 AM 79 (L) 150 - 450 x10*3/uL Final   04/22/2025 10:12 AM 71 (L) 150 - 450 x10*3/uL Final     PLATELET COUNT   Date/Time Value Ref Range Status   03/25/2025 12:11 PM 93 (L) 140 - 400 Thousand/uL Final     MPV   Date/Time Value Ref Range Status   03/25/2025 12:11 PM 12.4 7.5 - 12.5 fL Final     Neutrophils %   Date/Time Value Ref Range Status   05/06/2025 07:57 AM 75.9 40.0 - 80.0 % Final   04/30/2025 07:35 AM 69.7 40.0 - 80.0 % Final   04/22/2025 10:12 AM 73.2 40.0 - 80.0 % Final     Immature Granulocytes %, Automated   Date/Time Value Ref Range Status   05/06/2025 07:57 AM 1.8 (H) 0.0 - 0.9 % Final     Comment:     Immature Granulocyte Count (IG) includes promyelocytes, myelocytes and metamyelocytes but does not include bands. Percent differential counts (%) should be interpreted in the context of the absolute cell counts (cells/UL).   04/30/2025 07:35 AM 1.3 (H) 0.0 - 0.9 % Final     Comment:     Immature Granulocyte Count (IG) includes promyelocytes, myelocytes and metamyelocytes but does not include bands. Percent differential counts (%) should be interpreted in the context of the absolute cell counts (cells/UL).   04/22/2025 10:12 AM 1.2 (H) 0.0 - 0.9 % Final     Comment:     Immature Granulocyte Count (IG) includes promyelocytes, myelocytes and metamyelocytes but does not include bands. Percent differential counts (%) should be interpreted in the context of the absolute cell counts (cells/UL).     Lymphocytes %   Date/Time Value Ref Range Status   05/06/2025 07:57 AM 15.7 13.0 - 44.0 % Final   04/30/2025 07:35 AM 22.6 13.0 - 44.0 % Final    04/22/2025 10:12 AM 16.6 13.0 - 44.0 % Final     Monocytes %   Date/Time Value Ref Range Status   05/06/2025 07:57 AM 5.5 2.0 - 10.0 % Final   04/30/2025 07:35 AM 5.1 2.0 - 10.0 % Final   04/22/2025 10:12 AM 7.5 2.0 - 10.0 % Final     Eosinophils %   Date/Time Value Ref Range Status   05/06/2025 07:57 AM 0.5 0.0 - 6.0 % Final   04/30/2025 07:35 AM 0.8 0.0 - 6.0 % Final   04/22/2025 10:12 AM 1.0 0.0 - 6.0 % Final     Basophils %   Date/Time Value Ref Range Status   05/06/2025 07:57 AM 0.6 0.0 - 2.0 % Final   04/30/2025 07:35 AM 0.5 0.0 - 2.0 % Final   04/22/2025 10:12 AM 0.5 0.0 - 2.0 % Final     Neutrophils Absolute   Date/Time Value Ref Range Status   05/06/2025 07:57 AM 8.78 (H) 1.20 - 7.70 x10*3/uL Final     Comment:     Percent differential counts (%) should be interpreted in the context of the absolute cell counts (cells/uL).   04/30/2025 07:35 AM 6.59 1.20 - 7.70 x10*3/uL Final     Comment:     Percent differential counts (%) should be interpreted in the context of the absolute cell counts (cells/uL).   04/22/2025 10:12 AM 6.46 1.20 - 7.70 x10*3/uL Final     Comment:     Percent differential counts (%) should be interpreted in the context of the absolute cell counts (cells/uL).     Immature Granulocytes Absolute, Automated   Date/Time Value Ref Range Status   05/06/2025 07:57 AM 0.21 0.00 - 0.70 x10*3/uL Final   04/30/2025 07:35 AM 0.12 0.00 - 0.70 x10*3/uL Final   04/22/2025 10:12 AM 0.11 0.00 - 0.70 x10*3/uL Final     Lymphocytes Absolute   Date/Time Value Ref Range Status   05/06/2025 07:57 AM 1.82 1.20 - 4.80 x10*3/uL Final   04/30/2025 07:35 AM 2.14 1.20 - 4.80 x10*3/uL Final   04/22/2025 10:12 AM 1.47 1.20 - 4.80 x10*3/uL Final     Monocytes Absolute   Date/Time Value Ref Range Status   05/06/2025 07:57 AM 0.64 0.10 - 1.00 x10*3/uL Final   04/30/2025 07:35 AM 0.48 0.10 - 1.00 x10*3/uL Final   04/22/2025 10:12 AM 0.66 0.10 - 1.00 x10*3/uL Final     Eosinophils Absolute   Date/Time Value Ref Range Status  "  2025 07:57 AM 0.06 0.00 - 0.70 x10*3/uL Final   2025 07:35 AM 0.08 0.00 - 0.70 x10*3/uL Final   2025 10:12 AM 0.09 0.00 - 0.70 x10*3/uL Final     Basophils Absolute   Date/Time Value Ref Range Status   2025 07:57 AM 0.07 0.00 - 0.10 x10*3/uL Final   2025 07:35 AM 0.05 0.00 - 0.10 x10*3/uL Final   2025 10:12 AM 0.04 0.00 - 0.10 x10*3/uL Final       No components found for: \"PT\"  No results found for: \"APTT\"    Assessment/Plan    1) thrombocytopenia  -she used to live in [place] and also had her first child there; labwork was done regularly, even while pregnant, and she does not ever recall being told her platelet count was low  -she will check her medical records  -I reviewed her lab history  -10/21/2024 wbc 7.4, hgb 13.3, plt 150,000  -2025 wbc 9.0, hgb 12.7, plt 98,000 (done at [facility], collected 1205 PM, run 2131 PM)  -3/25/2025 wbc 8.9, hgb 13.6, plt 93,000, collected 1211 PM, run 6 days later at 1635 PM on 3/31/2025 (she says specimen was lost, then found)  -unclear if she may have autoimmune ITP at baseline-- in which case her plt count will be low at baseline, and get worse as pregnancy progresses  -gestational thrombocytopenia appears only during pregnancy, never dips below 100,000, and resolves after delivery  -will check CBC/diff freshly in office and also review peripheral smear  -wbc 10.2, hgb 13.5, plt 88,000, ANC 7980  -likely has ITP--and may need steroids in last portion of 3rd trimester  -will recheck CBC in 2 weeks  -here for interval followup via virtual/telehealth modality  -she was able to look up old lab results  -2019 (not pregnant) platelets 135,000  -2021 (pregnant) platelets 125,000, then 1 month before delivery platelets 88,000 (she was told thee was clumping noted); however, delivery complicated by rectus sheath hematoma on post op day 2 (hgb down to 5)  -MANAS was 5/15, now moved up to  for planned   -CBC checked on 2025 " reviewed--wbc 8.8, hgb 12.8, plt 71,000  -given her history of thrombocytopenia at baseline, that then is worsening as pregnancy progresses, this appears consistent with ITP  -as her due date is now approaching and she has had prior bleeding complication postpartum, will start her on steroids  -she will start with 50 mg PO x 3 days, then go down to 40 mg x 3 days  -will recheck CBC in 1 week  -here for interval followup via virtual/telehealth modality  -has been taking prednisone uneventfully so far  -CBC done this morning showed wbc 9.5, hgb 12.3, plt 79,000  -advised her to continue with prednisone 40 mg daily  -ADDENDUM- discussed with Dr Churchill--she discussed with anesthesia and they are comfortable with platelet count holding at least 70K to proceed with   -will now dial up prednisone to standard 1 mg/kg PO daily, to see if her platelet count can be boosted up any higher in order to minimize bleeding risk a/w surgery  -she will go up to 80 mg daily starting   -will recheck CBC again on   - is now booked for   -here for interval followup via virtual/telehealth modality  -has been taking prednisone 80 (eighty) mg daily--no ill effects other than insomnia  -CBC done earlier today: wbc 11.6, hgb 12.1, plt 82,000  -advised Melissa to continue with prednisone 80 mg daily until her   -gave her taper instructions for post-delivery period--she is to go down to prednisone 60 mg x 2 days then 40 mg x 2 days then 20 mg x 2 days then 10 mg x 2 days then stop  -will see her again in 3 months, primarily to document what her baseline platelet count, as thrombocytopenia tends to worsen when pregnant     2) active pregnancy  -currently 39 weeks pregnant  -EDC 2025     3) hypothyroidism  -on levothyroxine          Problem List Items Addressed This Visit           ICD-10-CM    Idiopathic thrombocytopenic purpura (Multi) D69.3            Daniel Gale MD                                 [1] No family history on file.

## 2025-05-07 ENCOUNTER — HOSPITAL ENCOUNTER (OUTPATIENT)
Facility: HOSPITAL | Age: 30
Setting detail: SURGERY ADMIT
Discharge: HOME | End: 2025-05-07
Attending: OBSTETRICS & GYNECOLOGY | Admitting: OBSTETRICS & GYNECOLOGY
Payer: COMMERCIAL

## 2025-05-07 ENCOUNTER — TELEPHONE (OUTPATIENT)
Dept: OBSTETRICS AND GYNECOLOGY | Facility: CLINIC | Age: 30
End: 2025-05-07

## 2025-05-07 ENCOUNTER — APPOINTMENT (OUTPATIENT)
Dept: OBSTETRICS AND GYNECOLOGY | Facility: CLINIC | Age: 30
End: 2025-05-07
Payer: COMMERCIAL

## 2025-05-07 VITALS
SYSTOLIC BLOOD PRESSURE: 131 MMHG | DIASTOLIC BLOOD PRESSURE: 77 MMHG | RESPIRATION RATE: 18 BRPM | BODY MASS INDEX: 32.34 KG/M2 | HEART RATE: 83 BPM | TEMPERATURE: 98.8 F | HEIGHT: 65 IN | OXYGEN SATURATION: 94 % | WEIGHT: 194.12 LBS

## 2025-05-07 PROBLEM — Z3A.39 39 WEEKS GESTATION OF PREGNANCY (HHS-HCC): Status: ACTIVE | Noted: 2025-04-23

## 2025-05-07 LAB
ABO GROUP (TYPE) IN BLOOD: NORMAL
ALBUMIN SERPL BCP-MCNC: 3.6 G/DL (ref 3.4–5)
ALP SERPL-CCNC: 99 U/L (ref 33–110)
ALT SERPL W P-5'-P-CCNC: 8 U/L (ref 7–45)
ANION GAP SERPL CALC-SCNC: 14 MMOL/L (ref 10–20)
ANTIBODY SCREEN: NORMAL
APTT PPP: 22 SECONDS (ref 26–36)
AST SERPL W P-5'-P-CCNC: 12 U/L (ref 9–39)
BILIRUB SERPL-MCNC: 0.4 MG/DL (ref 0–1.2)
BUN SERPL-MCNC: 9 MG/DL (ref 6–23)
CALCIUM SERPL-MCNC: 8.7 MG/DL (ref 8.6–10.3)
CHLORIDE SERPL-SCNC: 103 MMOL/L (ref 98–107)
CO2 SERPL-SCNC: 23 MMOL/L (ref 21–32)
CREAT SERPL-MCNC: 0.39 MG/DL (ref 0.5–1.05)
CREAT UR-MCNC: 64.7 MG/DL (ref 20–320)
EGFRCR SERPLBLD CKD-EPI 2021: >90 ML/MIN/1.73M*2
ERYTHROCYTE [DISTWIDTH] IN BLOOD BY AUTOMATED COUNT: 13.1 % (ref 11.5–14.5)
GLUCOSE SERPL-MCNC: 97 MG/DL (ref 74–99)
HCT VFR BLD AUTO: 39.7 % (ref 36–46)
HGB BLD-MCNC: 12.9 G/DL (ref 12–16)
INR PPP: 0.9 (ref 0.9–1.1)
LDH SERPL L TO P-CCNC: 167 U/L (ref 84–246)
MCH RBC QN AUTO: 27.4 PG (ref 26–34)
MCHC RBC AUTO-ENTMCNC: 32.5 G/DL (ref 32–36)
MCV RBC AUTO: 85 FL (ref 80–100)
NRBC BLD-RTO: 0 /100 WBCS (ref 0–0)
PLATELET # BLD AUTO: 90 X10*3/UL (ref 150–450)
POTASSIUM SERPL-SCNC: 4.2 MMOL/L (ref 3.5–5.3)
PROT SERPL-MCNC: 5.9 G/DL (ref 6.4–8.2)
PROT UR-ACNC: 11 MG/DL (ref 5–24)
PROT/CREAT UR: 0.17 MG/MG CREAT (ref 0–0.17)
PROTHROMBIN TIME: 9.8 SECONDS (ref 9.8–12.4)
RBC # BLD AUTO: 4.7 X10*6/UL (ref 4–5.2)
RH FACTOR (ANTIGEN D): NORMAL
SODIUM SERPL-SCNC: 136 MMOL/L (ref 136–145)
WBC # BLD AUTO: 14.9 X10*3/UL (ref 4.4–11.3)

## 2025-05-07 PROCEDURE — 86901 BLOOD TYPING SEROLOGIC RH(D): CPT | Performed by: OBSTETRICS & GYNECOLOGY

## 2025-05-07 PROCEDURE — 59025 FETAL NON-STRESS TEST: CPT | Performed by: OBSTETRICS & GYNECOLOGY

## 2025-05-07 PROCEDURE — 83615 LACTATE (LD) (LDH) ENZYME: CPT | Performed by: OBSTETRICS & GYNECOLOGY

## 2025-05-07 PROCEDURE — 80053 COMPREHEN METABOLIC PANEL: CPT | Performed by: OBSTETRICS & GYNECOLOGY

## 2025-05-07 PROCEDURE — 85730 THROMBOPLASTIN TIME PARTIAL: CPT | Performed by: OBSTETRICS & GYNECOLOGY

## 2025-05-07 PROCEDURE — 36415 COLL VENOUS BLD VENIPUNCTURE: CPT

## 2025-05-07 PROCEDURE — 82570 ASSAY OF URINE CREATININE: CPT | Performed by: OBSTETRICS & GYNECOLOGY

## 2025-05-07 PROCEDURE — 99214 OFFICE O/P EST MOD 30 MIN: CPT | Performed by: OBSTETRICS & GYNECOLOGY

## 2025-05-07 PROCEDURE — 86923 COMPATIBILITY TEST ELECTRIC: CPT

## 2025-05-07 PROCEDURE — 85027 COMPLETE CBC AUTOMATED: CPT | Performed by: OBSTETRICS & GYNECOLOGY

## 2025-05-07 PROCEDURE — 99215 OFFICE O/P EST HI 40 MIN: CPT

## 2025-05-07 PROCEDURE — 36415 COLL VENOUS BLD VENIPUNCTURE: CPT | Performed by: OBSTETRICS & GYNECOLOGY

## 2025-05-07 RX ORDER — HYDRALAZINE HYDROCHLORIDE 20 MG/ML
5 INJECTION INTRAMUSCULAR; INTRAVENOUS ONCE AS NEEDED
Status: DISCONTINUED | OUTPATIENT
Start: 2025-05-07 | End: 2025-05-07 | Stop reason: HOSPADM

## 2025-05-07 RX ORDER — CEFAZOLIN SODIUM 2 G/100ML
2 INJECTION, SOLUTION INTRAVENOUS ONCE
Status: DISCONTINUED | OUTPATIENT
Start: 2025-05-07 | End: 2025-05-07 | Stop reason: HOSPADM

## 2025-05-07 RX ORDER — LIDOCAINE HYDROCHLORIDE 10 MG/ML
0.5 INJECTION, SOLUTION EPIDURAL; INFILTRATION; INTRACAUDAL; PERINEURAL ONCE AS NEEDED
Status: DISCONTINUED | OUTPATIENT
Start: 2025-05-07 | End: 2025-05-07 | Stop reason: HOSPADM

## 2025-05-07 RX ORDER — ONDANSETRON HYDROCHLORIDE 2 MG/ML
4 INJECTION, SOLUTION INTRAVENOUS EVERY 6 HOURS PRN
Status: DISCONTINUED | OUTPATIENT
Start: 2025-05-07 | End: 2025-05-07 | Stop reason: HOSPADM

## 2025-05-07 RX ORDER — LABETALOL HYDROCHLORIDE 5 MG/ML
20 INJECTION, SOLUTION INTRAVENOUS ONCE AS NEEDED
Status: DISCONTINUED | OUTPATIENT
Start: 2025-05-07 | End: 2025-05-07 | Stop reason: HOSPADM

## 2025-05-07 RX ORDER — ONDANSETRON 4 MG/1
4 TABLET, FILM COATED ORAL EVERY 6 HOURS PRN
Status: DISCONTINUED | OUTPATIENT
Start: 2025-05-07 | End: 2025-05-07 | Stop reason: HOSPADM

## 2025-05-07 SDOH — SOCIAL STABILITY: SOCIAL INSECURITY: ARE YOU OR HAVE YOU BEEN THREATENED OR ABUSED PHYSICALLY, EMOTIONALLY, OR SEXUALLY BY ANYONE?: NO

## 2025-05-07 SDOH — HEALTH STABILITY: MENTAL HEALTH: WISH TO BE DEAD (PAST 1 MONTH): NO

## 2025-05-07 SDOH — SOCIAL STABILITY: SOCIAL INSECURITY: VERBAL ABUSE: DENIES

## 2025-05-07 SDOH — SOCIAL STABILITY: SOCIAL INSECURITY: ARE THERE ANY APPARENT SIGNS OF INJURIES/BEHAVIORS THAT COULD BE RELATED TO ABUSE/NEGLECT?: NO

## 2025-05-07 SDOH — HEALTH STABILITY: MENTAL HEALTH: SUICIDAL BEHAVIOR (LIFETIME): NO

## 2025-05-07 SDOH — SOCIAL STABILITY: SOCIAL INSECURITY: HAVE YOU HAD ANY THOUGHTS OF HARMING ANYONE ELSE?: NO

## 2025-05-07 SDOH — ECONOMIC STABILITY: HOUSING INSECURITY: DO YOU FEEL UNSAFE GOING BACK TO THE PLACE WHERE YOU ARE LIVING?: NO

## 2025-05-07 SDOH — HEALTH STABILITY: MENTAL HEALTH: NON-SPECIFIC ACTIVE SUICIDAL THOUGHTS (PAST 1 MONTH): NO

## 2025-05-07 SDOH — HEALTH STABILITY: MENTAL HEALTH: HAVE YOU USED ANY SUBSTANCES (CANABIS, COCAINE, HEROIN, HALLUCINOGENS, INHALANTS, ETC.) IN THE PAST 12 MONTHS?: NO

## 2025-05-07 SDOH — HEALTH STABILITY: MENTAL HEALTH: HAVE YOU USED ANY PRESCRIPTION DRUGS OTHER THAN PRESCRIBED IN THE PAST 12 MONTHS?: NO

## 2025-05-07 SDOH — SOCIAL STABILITY: SOCIAL INSECURITY: DO YOU FEEL ANYONE HAS EXPLOITED OR TAKEN ADVANTAGE OF YOU FINANCIALLY OR OF YOUR PERSONAL PROPERTY?: NO

## 2025-05-07 SDOH — SOCIAL STABILITY: SOCIAL INSECURITY: DOES ANYONE TRY TO KEEP YOU FROM HAVING/CONTACTING OTHER FRIENDS OR DOING THINGS OUTSIDE YOUR HOME?: NO

## 2025-05-07 SDOH — HEALTH STABILITY: MENTAL HEALTH: WERE YOU ABLE TO COMPLETE ALL THE BEHAVIORAL HEALTH SCREENINGS?: YES

## 2025-05-07 SDOH — SOCIAL STABILITY: SOCIAL INSECURITY: ABUSE SCREEN: ADULT

## 2025-05-07 SDOH — SOCIAL STABILITY: SOCIAL INSECURITY: HAVE YOU HAD THOUGHTS OF HARMING ANYONE ELSE?: NO

## 2025-05-07 SDOH — SOCIAL STABILITY: SOCIAL INSECURITY: HAS ANYONE EVER THREATENED TO HURT YOUR FAMILY OR YOUR PETS?: NO

## 2025-05-07 SDOH — SOCIAL STABILITY: SOCIAL INSECURITY: PHYSICAL ABUSE: DENIES

## 2025-05-07 ASSESSMENT — LIFESTYLE VARIABLES
HOW MANY STANDARD DRINKS CONTAINING ALCOHOL DO YOU HAVE ON A TYPICAL DAY: PATIENT DOES NOT DRINK
AUDIT-C TOTAL SCORE: 0
AUDIT-C TOTAL SCORE: 0
HOW OFTEN DO YOU HAVE A DRINK CONTAINING ALCOHOL: NEVER
HOW OFTEN DO YOU HAVE 6 OR MORE DRINKS ON ONE OCCASION: NEVER
SKIP TO QUESTIONS 9-10: 1

## 2025-05-07 ASSESSMENT — PAIN SCALES - GENERAL
PAINLEVEL_OUTOF10: 0 - NO PAIN

## 2025-05-07 ASSESSMENT — PATIENT HEALTH QUESTIONNAIRE - PHQ9
2. FEELING DOWN, DEPRESSED OR HOPELESS: NOT AT ALL
SUM OF ALL RESPONSES TO PHQ9 QUESTIONS 1 & 2: 0
1. LITTLE INTEREST OR PLEASURE IN DOING THINGS: NOT AT ALL

## 2025-05-07 NOTE — H&P
OB Triage H&P    Assessment/Plan    Melissa German is a 30 y.o.  at 38w6d, MANAS: 5/15/2025, by Last Menstrual Period, who presents to triage with elevated BP at home, no symptoms of preeclampsia with severe features. Pregnancy complicated by ITP.    Plan    -Fetal monitoring reassuring  -Good fetal movement  -will move up  delivery to tomorrow, blood bank requested platelets for procedure  -no hypertensive disorder of pregnancy  -no evidence labor/srom        Pregnancy Problems (from 09/10/24 to present)       Problem Noted Diagnosed Resolved    Fetal macrosomia affecting management of mother, antepartum ([facility]) 2025 by Myriam Churchill MD  No    Priority:  Medium       Overview Signed 2025  2:41 PM by Myriam Churchill MD   - 36 week growth USN - EFW 3700 grams (>99%) with AC >99%           37 weeks gestation of pregnancy ([facility]) 9/10/2024 by Myriam Churchill MD  No    Priority:  Medium       Overview Addendum 2025  2:47 PM by Elyse Robles MD   Desired provider in labor: [x] CNM  [] Physician  [x] Blood Products: [x] Yes, accepts [] No, needs counseling  [x] Initial BMI: 24.76   [x] Prenatal Labs:   [x] Cervical Cancer Screening up to date: 2024 - WNL (records scanned in)  [x] Rh status: O pos  [x] Genetic Screening:  RR NIPS, it's a BOY   [x] NT US: (11-13 wks): WNL  [x] Pregnancy dated by:  LMP c/w 7 week in-office USN     [x] Anatomy US: (19-20 wks): WNL other than low-lying placenta   [x] Federal Sterilization consent signed (if indicated): declines  [x] 1hr GCT at 24-28wks WNL  [] Fetal Surveillance (if indicated):  [x] Tdap: Declines   [x] Flu Vaccine: Declines   [x] Updated COVID vaccine recommended     [x] Breastfeeding: plans to breast feed - has pump ordered   [] Postpartum Birth control method:   [x] GBS at 36 - 37 wks: negative  [x] 39 weeks discussion of IOL vs. Expectant management: 39 weeks  [x] Mode of delivery ( anticipated ): rCS scheduled  for 39w4d on  at 8:30 with Kerline          33 weeks gestation of pregnancy ([facility]) 2025 by Daniel Gale MD  2025 by Myriam Churchill MD            Subjective   29 yo  at 38.6 weeks presents from home with mild range BP at home.  Has been feeling flushing and pelvic pressure.  Has had headache but goes away  with rest/tylenol.  Currently Does not endorse headache, vision change, RUQ pain, dyspnea, or chest pain. ,Has been having pelvic pressure and unsure if leaking clear urine or fluid.  No fever/chills.  No vaginal bleeding.  Good fetal movement.  H/o ITP recently increased her steroids to 80 mg daily prednisone.    Prenatal Provider Kerline    OB History    Para Term  AB Living   2 1 1 0 0 1   SAB IAB Ectopic Multiple Live Births   0 0 0 0 1      # Outcome Date GA Lbr Adolfo/2nd Weight Sex Type Anes PTL Lv   2 Current            1 Term 23 39w0d  3.81 kg F CS-LTranv Spinal N SVITLANA      Name: Alley       Surgical History[1]    Social History     Tobacco Use    Smoking status: Never    Smokeless tobacco: Never   Substance Use Topics    Alcohol use: Never       Allergies[2]    Prescriptions Prior to Admission[3]  Objective     Last Vitals  Temp Pulse Resp BP MAP O2 Sat   36.9 °C (98.4 °F) 79 18 134/76 96 (!) 94 %     Blood Pressures         2025  1325 2025  1429          BP: 127/70 134/76               Physical Exam  General: NAD, mood appropriate  Cardiopulmonary: warm and well perfused, breathing comfortably on room air  Abdomen: Gravid, non-tender  Extremities: Symmetric  Speculum Exam: no pooling of fluid seen, Nitrazine test is negative, deferred  Cervix: Closed /  /      Chaperone Present: Yes.  Chaperone Name/Title: Grace Zarate  Examination Chaperoned: Entire Physical Exam     Fetal Monitoring  Baseline: 145 bpm, Variability: moderate,  Accelerations: present and Decelerations: none  Uterine Activity: Irregular contractions  Interpretation:  Reactive    Bedside ultrasound: Yes    Labs in chart were reviewed.  CBC   Recent Labs     25  1342 25  0757   WBC 14.9* 11.6*   HGB 12.9 12.1   HCT 39.7 36.8   PLT 90* 82*     CMP      PCR   Recent Labs     25  1342   TPUC 11   CREATU 64.7   UTPCR 0.17     Coag   Recent Labs     25  1342   APTT 22*   PROTIME 9.8   INR 0.9      Results from last 7 days   Lab Units 25  1342 25  0757   WBC AUTO x10*3/uL 14.9* 11.6*   HEMOGLOBIN g/dL 12.9 12.1   HEMATOCRIT % 39.7 36.8   PLATELETS AUTO x10*3/uL 90* 82*                     [1]   Past Surgical History:  Procedure Laterality Date     SECTION, LOW TRANSVERSE      WISDOM TOOTH EXTRACTION Bilateral    [2] No Known Allergies  [3]   Medications Prior to Admission   Medication Sig Dispense Refill Last Dose/Taking    levothyroxine (Synthroid, Levoxyl) 50 mcg tablet Take 1 tablet (50 mcg) by mouth once daily.   2025 Morning    predniSONE (Deltasone) 10 mg tablet Take 50 mg daily and then taper per direction of physician. 100 tablet 1 2025    prenatal no115/iron/folic acid (PRENATAL 19 ORAL) Take by mouth.   Past Week

## 2025-05-07 NOTE — TELEPHONE ENCOUNTER
38.6 wk ob called ob line with some high BP concerns.  Patient is currently scheduled for IOL on 5/12.    BP last night was 144/81, patient reached out to on call provider but never got a call back, ended up falling asleep.    BP first thing this morning was 150/86 and about 30 minutes ago was 136/77.  Patient denies current headache, blurry vision or RUQ pain.  +FM    Patient is scheduled to see Dr Churchill today at 3 pm but is wondering if she should be seen sooner    Secure message sent to Dr Churchill for recommendations    Per Dr Churchill, patient advised to make her way into [facility] OB triage for evaluation.  Patient was advised due to generator issue at [facility], they are diverting all patients to [facility].  She was advised to bring her bag, as its likely she will be admitted if needed.  Patient agreed, will await ride, aiming to get here within the next hour or so.    Secure message sent to [facility] Triage to make them aware.

## 2025-05-08 ENCOUNTER — ANESTHESIA EVENT (OUTPATIENT)
Dept: OBSTETRICS AND GYNECOLOGY | Facility: HOSPITAL | Age: 30
End: 2025-05-08
Payer: COMMERCIAL

## 2025-05-08 ENCOUNTER — ANESTHESIA (OUTPATIENT)
Dept: OBSTETRICS AND GYNECOLOGY | Facility: HOSPITAL | Age: 30
End: 2025-05-08
Payer: COMMERCIAL

## 2025-05-08 ENCOUNTER — HOSPITAL ENCOUNTER (INPATIENT)
Facility: HOSPITAL | Age: 30
LOS: 3 days | Discharge: HOME | End: 2025-05-11
Attending: OBSTETRICS & GYNECOLOGY | Admitting: STUDENT IN AN ORGANIZED HEALTH CARE EDUCATION/TRAINING PROGRAM
Payer: COMMERCIAL

## 2025-05-08 ENCOUNTER — LAB REQUISITION (OUTPATIENT)
Dept: LAB | Facility: HOSPITAL | Age: 30
End: 2025-05-08
Payer: COMMERCIAL

## 2025-05-08 DIAGNOSIS — G89.18 POSTOPERATIVE PAIN: Primary | ICD-10-CM

## 2025-05-08 DIAGNOSIS — Z33.3 PREGNANT STATE, GESTATIONAL CARRIER (HHS-HCC): ICD-10-CM

## 2025-05-08 LAB
ABO GROUP (TYPE) IN BLOOD: NORMAL
ALBUMIN SERPL BCP-MCNC: 3.2 G/DL (ref 3.4–5)
ALP SERPL-CCNC: 91 U/L (ref 33–110)
ALT SERPL W P-5'-P-CCNC: 9 U/L (ref 7–45)
ANION GAP SERPL CALC-SCNC: 12 MMOL/L (ref 10–20)
ANTIBODY SCREEN: NORMAL
AST SERPL W P-5'-P-CCNC: 12 U/L (ref 9–39)
BASOPHILS # BLD AUTO: 0.04 X10*3/UL (ref 0–0.1)
BASOPHILS NFR BLD AUTO: 0.2 %
BILIRUB SERPL-MCNC: 0.4 MG/DL (ref 0–1.2)
BLOOD EXPIRATION DATE: NORMAL
BLOOD EXPIRATION DATE: NORMAL
BUN SERPL-MCNC: 10 MG/DL (ref 6–23)
CALCIUM SERPL-MCNC: 8.4 MG/DL (ref 8.6–10.3)
CHLORIDE SERPL-SCNC: 103 MMOL/L (ref 98–107)
CO2 SERPL-SCNC: 23 MMOL/L (ref 21–32)
CREAT SERPL-MCNC: 0.41 MG/DL (ref 0.5–1.05)
CREAT UR-MCNC: 146.2 MG/DL (ref 20–320)
DISPENSE STATUS: NORMAL
DISPENSE STATUS: NORMAL
EGFRCR SERPLBLD CKD-EPI 2021: >90 ML/MIN/1.73M*2
EOSINOPHIL # BLD AUTO: 0.01 X10*3/UL (ref 0–0.7)
EOSINOPHIL NFR BLD AUTO: 0 %
ERYTHROCYTE [DISTWIDTH] IN BLOOD BY AUTOMATED COUNT: 13.2 % (ref 11.5–14.5)
ERYTHROCYTE [DISTWIDTH] IN BLOOD BY AUTOMATED COUNT: 13.3 % (ref 11.5–14.5)
ERYTHROCYTE [DISTWIDTH] IN BLOOD BY AUTOMATED COUNT: 13.4 % (ref 11.5–14.5)
GIANT PLATELETS BLD QL SMEAR: NORMAL
GLUCOSE SERPL-MCNC: 79 MG/DL (ref 74–99)
HCT VFR BLD AUTO: 36 % (ref 36–46)
HCT VFR BLD AUTO: 37.3 % (ref 36–46)
HCT VFR BLD AUTO: 39 % (ref 36–46)
HGB BLD-MCNC: 11.8 G/DL (ref 12–16)
HGB BLD-MCNC: 11.9 G/DL (ref 12–16)
HGB BLD-MCNC: 12.6 G/DL (ref 12–16)
HOLD SPECIMEN: NORMAL
HOLD SPECIMEN: NORMAL
IMM GRANULOCYTES # BLD AUTO: 0.23 X10*3/UL (ref 0–0.7)
IMM GRANULOCYTES NFR BLD AUTO: 1.1 % (ref 0–0.9)
LDH SERPL L TO P-CCNC: 149 U/L (ref 84–246)
LYMPHOCYTES # BLD AUTO: 1.24 X10*3/UL (ref 1.2–4.8)
LYMPHOCYTES NFR BLD AUTO: 5.7 %
MCH RBC QN AUTO: 27.2 PG (ref 26–34)
MCH RBC QN AUTO: 27.5 PG (ref 26–34)
MCH RBC QN AUTO: 28.6 PG (ref 26–34)
MCHC RBC AUTO-ENTMCNC: 31.9 G/DL (ref 32–36)
MCHC RBC AUTO-ENTMCNC: 32.3 G/DL (ref 32–36)
MCHC RBC AUTO-ENTMCNC: 32.8 G/DL (ref 32–36)
MCV RBC AUTO: 84 FL (ref 80–100)
MCV RBC AUTO: 86 FL (ref 80–100)
MCV RBC AUTO: 87 FL (ref 80–100)
MONOCYTES # BLD AUTO: 0.84 X10*3/UL (ref 0.1–1)
MONOCYTES NFR BLD AUTO: 3.9 %
NEUTROPHILS # BLD AUTO: 19.45 X10*3/UL (ref 1.2–7.7)
NEUTROPHILS NFR BLD AUTO: 89.1 %
NRBC BLD-RTO: 0 /100 WBCS (ref 0–0)
PLATELET # BLD AUTO: 105 X10*3/UL (ref 150–450)
PLATELET # BLD AUTO: 147 X10*3/UL (ref 150–450)
PLATELET # BLD AUTO: 88 X10*3/UL (ref 150–450)
POTASSIUM SERPL-SCNC: 3.8 MMOL/L (ref 3.5–5.3)
PRODUCT BLOOD TYPE: 5100
PRODUCT BLOOD TYPE: 600
PRODUCT CODE: NORMAL
PRODUCT CODE: NORMAL
PROT SERPL-MCNC: 5.5 G/DL (ref 6.4–8.2)
PROT UR-ACNC: 32 MG/DL (ref 5–24)
PROT/CREAT UR: 0.22 MG/MG CREAT (ref 0–0.17)
RBC # BLD AUTO: 4.13 X10*6/UL (ref 4–5.2)
RBC # BLD AUTO: 4.32 X10*6/UL (ref 4–5.2)
RBC # BLD AUTO: 4.63 X10*6/UL (ref 4–5.2)
RBC MORPH BLD: NORMAL
RH FACTOR (ANTIGEN D): NORMAL
SODIUM SERPL-SCNC: 134 MMOL/L (ref 136–145)
TREPONEMA PALLIDUM IGG+IGM AB [PRESENCE] IN SERUM OR PLASMA BY IMMUNOASSAY: NONREACTIVE
UNIT ABO: NORMAL
UNIT ABO: NORMAL
UNIT NUMBER: NORMAL
UNIT NUMBER: NORMAL
UNIT RH: NORMAL
UNIT RH: NORMAL
UNIT VOLUME: 333
UNIT VOLUME: 350
WBC # BLD AUTO: 13.8 X10*3/UL (ref 4.4–11.3)
WBC # BLD AUTO: 14.1 X10*3/UL (ref 4.4–11.3)
WBC # BLD AUTO: 21.8 X10*3/UL (ref 4.4–11.3)
XM INTEP: NORMAL

## 2025-05-08 PROCEDURE — 86900 BLOOD TYPING SEROLOGIC ABO: CPT

## 2025-05-08 PROCEDURE — 85025 COMPLETE CBC W/AUTO DIFF WBC: CPT | Performed by: OBSTETRICS & GYNECOLOGY

## 2025-05-08 PROCEDURE — 2500000004 HC RX 250 GENERAL PHARMACY W/ HCPCS (ALT 636 FOR OP/ED): Performed by: NURSE ANESTHETIST, CERTIFIED REGISTERED

## 2025-05-08 PROCEDURE — 2500000005 HC RX 250 GENERAL PHARMACY W/O HCPCS: Performed by: NURSE ANESTHETIST, CERTIFIED REGISTERED

## 2025-05-08 PROCEDURE — 84156 ASSAY OF PROTEIN URINE: CPT | Performed by: STUDENT IN AN ORGANIZED HEALTH CARE EDUCATION/TRAINING PROGRAM

## 2025-05-08 PROCEDURE — 85027 COMPLETE CBC AUTOMATED: CPT | Performed by: OBSTETRICS & GYNECOLOGY

## 2025-05-08 PROCEDURE — 59510 CESAREAN DELIVERY: CPT | Performed by: OBSTETRICS & GYNECOLOGY

## 2025-05-08 PROCEDURE — 01961 ANES CESAREAN DELIVERY ONLY: CPT | Performed by: NURSE ANESTHETIST, CERTIFIED REGISTERED

## 2025-05-08 PROCEDURE — 1220000001 HC OB SEMI-PRIVATE ROOM DAILY

## 2025-05-08 PROCEDURE — 2500000004 HC RX 250 GENERAL PHARMACY W/ HCPCS (ALT 636 FOR OP/ED): Mod: JZ | Performed by: OBSTETRICS & GYNECOLOGY

## 2025-05-08 PROCEDURE — 2500000004 HC RX 250 GENERAL PHARMACY W/ HCPCS (ALT 636 FOR OP/ED): Performed by: STUDENT IN AN ORGANIZED HEALTH CARE EDUCATION/TRAINING PROGRAM

## 2025-05-08 PROCEDURE — 83615 LACTATE (LD) (LDH) ENZYME: CPT | Performed by: STUDENT IN AN ORGANIZED HEALTH CARE EDUCATION/TRAINING PROGRAM

## 2025-05-08 PROCEDURE — 2500000004 HC RX 250 GENERAL PHARMACY W/ HCPCS (ALT 636 FOR OP/ED): Mod: JZ | Performed by: STUDENT IN AN ORGANIZED HEALTH CARE EDUCATION/TRAINING PROGRAM

## 2025-05-08 PROCEDURE — 7100000016 HC LABOR RECOVERY PER HOUR: Performed by: OBSTETRICS & GYNECOLOGY

## 2025-05-08 PROCEDURE — 36415 COLL VENOUS BLD VENIPUNCTURE: CPT | Performed by: STUDENT IN AN ORGANIZED HEALTH CARE EDUCATION/TRAINING PROGRAM

## 2025-05-08 PROCEDURE — 80053 COMPREHEN METABOLIC PANEL: CPT | Performed by: STUDENT IN AN ORGANIZED HEALTH CARE EDUCATION/TRAINING PROGRAM

## 2025-05-08 PROCEDURE — 2500000004 HC RX 250 GENERAL PHARMACY W/ HCPCS (ALT 636 FOR OP/ED): Mod: JZ | Performed by: NURSE ANESTHETIST, CERTIFIED REGISTERED

## 2025-05-08 PROCEDURE — 7100000016 HC LABOR RECOVERY PER HOUR

## 2025-05-08 PROCEDURE — P9035 PLATELET PHERES LEUKOREDUCED: HCPCS

## 2025-05-08 PROCEDURE — 86850 RBC ANTIBODY SCREEN: CPT

## 2025-05-08 PROCEDURE — 2500000002 HC RX 250 W HCPCS SELF ADMINISTERED DRUGS (ALT 637 FOR MEDICARE OP, ALT 636 FOR OP/ED): Performed by: OBSTETRICS & GYNECOLOGY

## 2025-05-08 PROCEDURE — 85027 COMPLETE CBC AUTOMATED: CPT | Performed by: STUDENT IN AN ORGANIZED HEALTH CARE EDUCATION/TRAINING PROGRAM

## 2025-05-08 PROCEDURE — 2500000004 HC RX 250 GENERAL PHARMACY W/ HCPCS (ALT 636 FOR OP/ED): Performed by: OBSTETRICS & GYNECOLOGY

## 2025-05-08 PROCEDURE — 36430 TRANSFUSION BLD/BLD COMPNT: CPT

## 2025-05-08 PROCEDURE — 86780 TREPONEMA PALLIDUM: CPT | Mod: STJLAB | Performed by: STUDENT IN AN ORGANIZED HEALTH CARE EDUCATION/TRAINING PROGRAM

## 2025-05-08 PROCEDURE — 2720000007 HC OR 272 NO HCPCS: Performed by: OBSTETRICS & GYNECOLOGY

## 2025-05-08 PROCEDURE — 3700000014 HC AN EPIDURAL BLOCK CHARGE: Performed by: OBSTETRICS & GYNECOLOGY

## 2025-05-08 PROCEDURE — 86901 BLOOD TYPING SEROLOGIC RH(D): CPT

## 2025-05-08 PROCEDURE — 59514 CESAREAN DELIVERY ONLY: CPT | Performed by: OBSTETRICS & GYNECOLOGY

## 2025-05-08 PROCEDURE — 2500000001 HC RX 250 WO HCPCS SELF ADMINISTERED DRUGS (ALT 637 FOR MEDICARE OP): Performed by: OBSTETRICS & GYNECOLOGY

## 2025-05-08 PROCEDURE — 0W3R7ZZ CONTROL BLEEDING IN GENITOURINARY TRACT, VIA NATURAL OR ARTIFICIAL OPENING: ICD-10-PCS | Performed by: OBSTETRICS & GYNECOLOGY

## 2025-05-08 PROCEDURE — P9016 RBC LEUKOCYTES REDUCED: HCPCS

## 2025-05-08 RX ORDER — TRANEXAMIC ACID 100 MG/ML
INJECTION, SOLUTION INTRAVENOUS AS NEEDED
Status: DISCONTINUED | OUTPATIENT
Start: 2025-05-08 | End: 2025-05-08

## 2025-05-08 RX ORDER — SCOPOLAMINE 1 MG/3D
PATCH, EXTENDED RELEASE TRANSDERMAL AS NEEDED
Status: DISCONTINUED | OUTPATIENT
Start: 2025-05-08 | End: 2025-05-08

## 2025-05-08 RX ORDER — TRANEXAMIC ACID 100 MG/ML
1000 INJECTION, SOLUTION INTRAVENOUS ONCE AS NEEDED
Status: DISCONTINUED | OUTPATIENT
Start: 2025-05-08 | End: 2025-05-08

## 2025-05-08 RX ORDER — LOPERAMIDE HYDROCHLORIDE 2 MG/1
4 CAPSULE ORAL EVERY 2 HOUR PRN
Status: DISCONTINUED | OUTPATIENT
Start: 2025-05-08 | End: 2025-05-08

## 2025-05-08 RX ORDER — HYDRALAZINE HYDROCHLORIDE 20 MG/ML
5 INJECTION INTRAMUSCULAR; INTRAVENOUS ONCE AS NEEDED
Status: DISCONTINUED | OUTPATIENT
Start: 2025-05-08 | End: 2025-05-08

## 2025-05-08 RX ORDER — OXYTOCIN/0.9 % SODIUM CHLORIDE 30/500 ML
60 PLASTIC BAG, INJECTION (ML) INTRAVENOUS ONCE AS NEEDED
Status: DISCONTINUED | OUTPATIENT
Start: 2025-05-08 | End: 2025-05-08

## 2025-05-08 RX ORDER — ONDANSETRON HYDROCHLORIDE 2 MG/ML
4 INJECTION, SOLUTION INTRAVENOUS EVERY 6 HOURS PRN
Status: DISCONTINUED | OUTPATIENT
Start: 2025-05-08 | End: 2025-05-11 | Stop reason: HOSPADM

## 2025-05-08 RX ORDER — OXYCODONE HYDROCHLORIDE 5 MG/1
5 TABLET ORAL EVERY 4 HOURS PRN
Status: DISCONTINUED | OUTPATIENT
Start: 2025-05-09 | End: 2025-05-11 | Stop reason: HOSPADM

## 2025-05-08 RX ORDER — ACETAMINOPHEN 325 MG/1
975 TABLET ORAL EVERY 6 HOURS
Status: DISCONTINUED | OUTPATIENT
Start: 2025-05-08 | End: 2025-05-11 | Stop reason: HOSPADM

## 2025-05-08 RX ORDER — HYDROMORPHONE HYDROCHLORIDE 0.2 MG/ML
0.2 INJECTION INTRAMUSCULAR; INTRAVENOUS; SUBCUTANEOUS EVERY 5 MIN PRN
Status: DISCONTINUED | OUTPATIENT
Start: 2025-05-08 | End: 2025-05-11 | Stop reason: HOSPADM

## 2025-05-08 RX ORDER — OXYTOCIN/0.9 % SODIUM CHLORIDE 30/500 ML
PLASTIC BAG, INJECTION (ML) INTRAVENOUS CONTINUOUS PRN
Status: DISCONTINUED | OUTPATIENT
Start: 2025-05-08 | End: 2025-05-08

## 2025-05-08 RX ORDER — ONDANSETRON 4 MG/1
4 TABLET, FILM COATED ORAL EVERY 6 HOURS PRN
Status: DISCONTINUED | OUTPATIENT
Start: 2025-05-08 | End: 2025-05-08

## 2025-05-08 RX ORDER — ONDANSETRON 4 MG/1
4 TABLET, FILM COATED ORAL EVERY 6 HOURS PRN
Status: DISCONTINUED | OUTPATIENT
Start: 2025-05-08 | End: 2025-05-11 | Stop reason: HOSPADM

## 2025-05-08 RX ORDER — KETOROLAC TROMETHAMINE 30 MG/ML
INJECTION, SOLUTION INTRAMUSCULAR; INTRAVENOUS AS NEEDED
Status: DISCONTINUED | OUTPATIENT
Start: 2025-05-08 | End: 2025-05-08

## 2025-05-08 RX ORDER — DEXAMETHASONE SODIUM PHOSPHATE 10 MG/ML
INJECTION INTRAMUSCULAR; INTRAVENOUS AS NEEDED
Status: DISCONTINUED | OUTPATIENT
Start: 2025-05-08 | End: 2025-05-08

## 2025-05-08 RX ORDER — METHYLERGONOVINE MALEATE 0.2 MG/ML
0.2 INJECTION INTRAVENOUS ONCE AS NEEDED
Status: DISCONTINUED | OUTPATIENT
Start: 2025-05-08 | End: 2025-05-11 | Stop reason: HOSPADM

## 2025-05-08 RX ORDER — FENTANYL CITRATE 50 UG/ML
INJECTION, SOLUTION INTRAMUSCULAR; INTRAVENOUS AS NEEDED
Status: DISCONTINUED | OUTPATIENT
Start: 2025-05-08 | End: 2025-05-08

## 2025-05-08 RX ORDER — ONDANSETRON HYDROCHLORIDE 2 MG/ML
4 INJECTION, SOLUTION INTRAVENOUS EVERY 6 HOURS PRN
Status: DISCONTINUED | OUTPATIENT
Start: 2025-05-08 | End: 2025-05-08

## 2025-05-08 RX ORDER — SIMETHICONE 80 MG
80 TABLET,CHEWABLE ORAL 4 TIMES DAILY PRN
Status: DISCONTINUED | OUTPATIENT
Start: 2025-05-08 | End: 2025-05-11 | Stop reason: HOSPADM

## 2025-05-08 RX ORDER — MISOPROSTOL 200 UG/1
400 TABLET ORAL ONCE
Status: COMPLETED | OUTPATIENT
Start: 2025-05-08 | End: 2025-05-08

## 2025-05-08 RX ORDER — HYDRALAZINE HYDROCHLORIDE 20 MG/ML
5 INJECTION INTRAMUSCULAR; INTRAVENOUS ONCE AS NEEDED
Status: DISCONTINUED | OUTPATIENT
Start: 2025-05-08 | End: 2025-05-11 | Stop reason: HOSPADM

## 2025-05-08 RX ORDER — LABETALOL HYDROCHLORIDE 5 MG/ML
20 INJECTION, SOLUTION INTRAVENOUS ONCE AS NEEDED
Status: DISCONTINUED | OUTPATIENT
Start: 2025-05-08 | End: 2025-05-08

## 2025-05-08 RX ORDER — SODIUM CHLORIDE, SODIUM LACTATE, POTASSIUM CHLORIDE, CALCIUM CHLORIDE 600; 310; 30; 20 MG/100ML; MG/100ML; MG/100ML; MG/100ML
75 INJECTION, SOLUTION INTRAVENOUS CONTINUOUS
Status: DISCONTINUED | OUTPATIENT
Start: 2025-05-08 | End: 2025-05-08

## 2025-05-08 RX ORDER — PHENYLEPHRINE 10 MG/250 ML(40 MCG/ML)IN 0.9 % SOD.CHLORIDE INTRAVENOUS
CONTINUOUS PRN
Status: DISCONTINUED | OUTPATIENT
Start: 2025-05-08 | End: 2025-05-08

## 2025-05-08 RX ORDER — NALOXONE HYDROCHLORIDE 0.4 MG/ML
0.1 INJECTION, SOLUTION INTRAMUSCULAR; INTRAVENOUS; SUBCUTANEOUS EVERY 5 MIN PRN
Status: DISCONTINUED | OUTPATIENT
Start: 2025-05-08 | End: 2025-05-11 | Stop reason: HOSPADM

## 2025-05-08 RX ORDER — OXYCODONE HYDROCHLORIDE 10 MG/1
10 TABLET ORAL EVERY 4 HOURS PRN
Status: DISCONTINUED | OUTPATIENT
Start: 2025-05-09 | End: 2025-05-11 | Stop reason: HOSPADM

## 2025-05-08 RX ORDER — FAMOTIDINE 10 MG/ML
INJECTION INTRAVENOUS AS NEEDED
Status: DISCONTINUED | OUTPATIENT
Start: 2025-05-08 | End: 2025-05-08

## 2025-05-08 RX ORDER — POLYETHYLENE GLYCOL 3350 17 G/17G
17 POWDER, FOR SOLUTION ORAL 2 TIMES DAILY PRN
Status: DISCONTINUED | OUTPATIENT
Start: 2025-05-08 | End: 2025-05-11 | Stop reason: HOSPADM

## 2025-05-08 RX ORDER — OXYTOCIN 10 [USP'U]/ML
10 INJECTION, SOLUTION INTRAMUSCULAR; INTRAVENOUS ONCE AS NEEDED
Status: DISCONTINUED | OUTPATIENT
Start: 2025-05-08 | End: 2025-05-08

## 2025-05-08 RX ORDER — LEVOTHYROXINE SODIUM 50 UG/1
50 TABLET ORAL DAILY
Status: DISCONTINUED | OUTPATIENT
Start: 2025-05-08 | End: 2025-05-11 | Stop reason: HOSPADM

## 2025-05-08 RX ORDER — LABETALOL HYDROCHLORIDE 5 MG/ML
20 INJECTION, SOLUTION INTRAVENOUS ONCE AS NEEDED
Status: DISCONTINUED | OUTPATIENT
Start: 2025-05-08 | End: 2025-05-11 | Stop reason: HOSPADM

## 2025-05-08 RX ORDER — MISOPROSTOL 200 UG/1
800 TABLET ORAL ONCE AS NEEDED
Status: DISCONTINUED | OUTPATIENT
Start: 2025-05-08 | End: 2025-05-08

## 2025-05-08 RX ORDER — BUPIVACAINE HYDROCHLORIDE 7.5 MG/ML
INJECTION INTRAVENOUS AS NEEDED
Status: DISCONTINUED | OUTPATIENT
Start: 2025-05-08 | End: 2025-05-08

## 2025-05-08 RX ORDER — LOPERAMIDE HYDROCHLORIDE 2 MG/1
4 CAPSULE ORAL EVERY 2 HOUR PRN
Status: DISCONTINUED | OUTPATIENT
Start: 2025-05-08 | End: 2025-05-11 | Stop reason: HOSPADM

## 2025-05-08 RX ORDER — METHYLERGONOVINE MALEATE 0.2 MG/ML
0.2 INJECTION INTRAVENOUS ONCE AS NEEDED
Status: COMPLETED | OUTPATIENT
Start: 2025-05-08 | End: 2025-05-08

## 2025-05-08 RX ORDER — PREDNISONE 20 MG/1
40 TABLET ORAL DAILY
Status: DISCONTINUED | OUTPATIENT
Start: 2025-05-10 | End: 2025-05-08

## 2025-05-08 RX ORDER — OXYTOCIN 10 [USP'U]/ML
10 INJECTION, SOLUTION INTRAMUSCULAR; INTRAVENOUS ONCE AS NEEDED
Status: DISCONTINUED | OUTPATIENT
Start: 2025-05-08 | End: 2025-05-11 | Stop reason: HOSPADM

## 2025-05-08 RX ORDER — OXYTOCIN/0.9 % SODIUM CHLORIDE 30/500 ML
60 PLASTIC BAG, INJECTION (ML) INTRAVENOUS ONCE AS NEEDED
Status: DISCONTINUED | OUTPATIENT
Start: 2025-05-08 | End: 2025-05-11 | Stop reason: HOSPADM

## 2025-05-08 RX ORDER — PREDNISONE 10 MG/1
10 TABLET ORAL DAILY
Status: DISCONTINUED | OUTPATIENT
Start: 2025-05-14 | End: 2025-05-11 | Stop reason: HOSPADM

## 2025-05-08 RX ORDER — MORPHINE SULFATE 1 MG/ML
INJECTION, SOLUTION EPIDURAL; INTRATHECAL; INTRAVENOUS AS NEEDED
Status: DISCONTINUED | OUTPATIENT
Start: 2025-05-08 | End: 2025-05-08

## 2025-05-08 RX ORDER — DIPHENHYDRAMINE HYDROCHLORIDE 50 MG/ML
25 INJECTION, SOLUTION INTRAMUSCULAR; INTRAVENOUS EVERY 4 HOURS PRN
Status: DISCONTINUED | OUTPATIENT
Start: 2025-05-08 | End: 2025-05-11 | Stop reason: HOSPADM

## 2025-05-08 RX ORDER — LIDOCAINE 560 MG/1
1 PATCH PERCUTANEOUS; TOPICAL; TRANSDERMAL
Status: DISCONTINUED | OUTPATIENT
Start: 2025-05-08 | End: 2025-05-11 | Stop reason: HOSPADM

## 2025-05-08 RX ORDER — TRANEXAMIC ACID 100 MG/ML
1000 INJECTION, SOLUTION INTRAVENOUS ONCE AS NEEDED
Status: DISCONTINUED | OUTPATIENT
Start: 2025-05-08 | End: 2025-05-11 | Stop reason: HOSPADM

## 2025-05-08 RX ORDER — CARBOPROST TROMETHAMINE 250 UG/ML
250 INJECTION, SOLUTION INTRAMUSCULAR ONCE AS NEEDED
Status: DISCONTINUED | OUTPATIENT
Start: 2025-05-08 | End: 2025-05-11 | Stop reason: HOSPADM

## 2025-05-08 RX ORDER — PREDNISONE 20 MG/1
40 TABLET ORAL DAILY
Status: COMPLETED | OUTPATIENT
Start: 2025-05-10 | End: 2025-05-11

## 2025-05-08 RX ORDER — LIDOCAINE HYDROCHLORIDE 10 MG/ML
30 INJECTION, SOLUTION INFILTRATION; PERINEURAL ONCE AS NEEDED
Status: DISCONTINUED | OUTPATIENT
Start: 2025-05-08 | End: 2025-05-08

## 2025-05-08 RX ORDER — TERBUTALINE SULFATE 1 MG/ML
0.25 INJECTION SUBCUTANEOUS ONCE AS NEEDED
Status: DISCONTINUED | OUTPATIENT
Start: 2025-05-08 | End: 2025-05-08

## 2025-05-08 RX ORDER — PREDNISONE 20 MG/1
20 TABLET ORAL DAILY
Status: DISCONTINUED | OUTPATIENT
Start: 2025-05-12 | End: 2025-05-11 | Stop reason: HOSPADM

## 2025-05-08 RX ORDER — CEFAZOLIN SODIUM 2 G/100ML
2 INJECTION, SOLUTION INTRAVENOUS ONCE
Status: COMPLETED | OUTPATIENT
Start: 2025-05-08 | End: 2025-05-08

## 2025-05-08 RX ORDER — MISOPROSTOL 200 UG/1
800 TABLET ORAL ONCE AS NEEDED
Status: DISCONTINUED | OUTPATIENT
Start: 2025-05-08 | End: 2025-05-11 | Stop reason: HOSPADM

## 2025-05-08 RX ORDER — ADHESIVE BANDAGE
10 BANDAGE TOPICAL
Status: DISCONTINUED | OUTPATIENT
Start: 2025-05-08 | End: 2025-05-11 | Stop reason: HOSPADM

## 2025-05-08 RX ORDER — DIPHENHYDRAMINE HCL 25 MG
25 TABLET ORAL EVERY 4 HOURS PRN
Status: DISCONTINUED | OUTPATIENT
Start: 2025-05-08 | End: 2025-05-11 | Stop reason: HOSPADM

## 2025-05-08 RX ORDER — MIFEPRISTONE 200 MG/1
400 TABLET ORAL ONCE
Status: DISCONTINUED | OUTPATIENT
Start: 2025-05-08 | End: 2025-05-08 | Stop reason: ALTCHOICE

## 2025-05-08 RX ORDER — CARBOPROST TROMETHAMINE 250 UG/ML
250 INJECTION, SOLUTION INTRAMUSCULAR ONCE AS NEEDED
Status: DISCONTINUED | OUTPATIENT
Start: 2025-05-08 | End: 2025-05-08

## 2025-05-08 RX ADMIN — TRANEXAMIC ACID 1000 MG: 1 INJECTION, SOLUTION INTRAVENOUS at 09:11

## 2025-05-08 RX ADMIN — METHYLERGONOVINE MALEATE 0.2 MG: 0.2 INJECTION, SOLUTION INTRAMUSCULAR; INTRAVENOUS at 10:20

## 2025-05-08 RX ADMIN — BUPIVACAINE HYDROCHLORIDE IN DEXTROSE 1.6 ML: 7.5 INJECTION, SOLUTION SUBARACHNOID at 08:44

## 2025-05-08 RX ADMIN — SODIUM CHLORIDE, POTASSIUM CHLORIDE, SODIUM LACTATE AND CALCIUM CHLORIDE: 600; 310; 30; 20 INJECTION, SOLUTION INTRAVENOUS at 08:30

## 2025-05-08 RX ADMIN — PHENYLEPHRINE-NACL IV SOLUTION 10 MG/250ML-0.9% 0.57 MCG/KG/MIN: 10-0.9/25 SOLUTION at 08:54

## 2025-05-08 RX ADMIN — SCOPOLAMINE 1 PATCH: 1.5 PATCH, EXTENDED RELEASE TRANSDERMAL at 08:23

## 2025-05-08 RX ADMIN — KETOROLAC TROMETHAMINE 30 MG: 30 INJECTION, SOLUTION INTRAMUSCULAR; INTRAVENOUS at 09:15

## 2025-05-08 RX ADMIN — PREDNISONE 60 MG: 50 TABLET ORAL at 17:23

## 2025-05-08 RX ADMIN — ACETAMINOPHEN 975 MG: 325 TABLET ORAL at 16:42

## 2025-05-08 RX ADMIN — ACETAMINOPHEN 975 MG: 325 TABLET ORAL at 22:37

## 2025-05-08 RX ADMIN — HYDROMORPHONE HYDROCHLORIDE 0.2 MG: 0.2 INJECTION, SOLUTION INTRAMUSCULAR; INTRAVENOUS; SUBCUTANEOUS at 23:27

## 2025-05-08 RX ADMIN — FENTANYL CITRATE 10 MCG: 50 INJECTION, SOLUTION INTRAMUSCULAR; INTRAVENOUS at 08:44

## 2025-05-08 RX ADMIN — MORPHINE SULFATE 0.1 MG: 1 INJECTION, SOLUTION EPIDURAL; INTRATHECAL; INTRAVENOUS at 08:44

## 2025-05-08 RX ADMIN — MISOPROSTOL 400 MCG: 200 TABLET ORAL at 10:40

## 2025-05-08 RX ADMIN — DEXAMETHASONE SODIUM PHOSPHATE 4 MG: 10 INJECTION, SOLUTION INTRAMUSCULAR; INTRAVENOUS at 08:44

## 2025-05-08 RX ADMIN — CEFAZOLIN SODIUM 2 G: 2 INJECTION, SOLUTION INTRAVENOUS at 08:46

## 2025-05-08 RX ADMIN — Medication 600 MILLI-UNITS/MIN: at 09:10

## 2025-05-08 RX ADMIN — ONDANSETRON 4 MG: 2 INJECTION, SOLUTION INTRAMUSCULAR; INTRAVENOUS at 08:30

## 2025-05-08 RX ADMIN — FAMOTIDINE 20 MG: 10 INJECTION, SOLUTION INTRAVENOUS at 08:23

## 2025-05-08 SDOH — ECONOMIC STABILITY: TRANSPORTATION INSECURITY: IN THE PAST 12 MONTHS, HAS LACK OF TRANSPORTATION KEPT YOU FROM MEDICAL APPOINTMENTS OR FROM GETTING MEDICATIONS?: NO

## 2025-05-08 SDOH — SOCIAL STABILITY: SOCIAL INSECURITY: ARE YOU OR HAVE YOU BEEN THREATENED OR ABUSED PHYSICALLY, EMOTIONALLY, OR SEXUALLY BY ANYONE?: NO

## 2025-05-08 SDOH — HEALTH STABILITY: MENTAL HEALTH: HOW OFTEN DO YOU HAVE SIX OR MORE DRINKS ON ONE OCCASION?: NEVER

## 2025-05-08 SDOH — SOCIAL STABILITY: SOCIAL INSECURITY: WITHIN THE LAST YEAR, HAVE YOU BEEN HUMILIATED OR EMOTIONALLY ABUSED IN OTHER WAYS BY YOUR PARTNER OR EX-PARTNER?: NO

## 2025-05-08 SDOH — ECONOMIC STABILITY: FOOD INSECURITY: HOW HARD IS IT FOR YOU TO PAY FOR THE VERY BASICS LIKE FOOD, HOUSING, MEDICAL CARE, AND HEATING?: NOT HARD AT ALL

## 2025-05-08 SDOH — HEALTH STABILITY: MENTAL HEALTH: NON-SPECIFIC ACTIVE SUICIDAL THOUGHTS (PAST 1 MONTH): NO

## 2025-05-08 SDOH — ECONOMIC STABILITY: FOOD INSECURITY: WITHIN THE PAST 12 MONTHS, THE FOOD YOU BOUGHT JUST DIDN'T LAST AND YOU DIDN'T HAVE MONEY TO GET MORE.: NEVER TRUE

## 2025-05-08 SDOH — SOCIAL STABILITY: SOCIAL INSECURITY: PHYSICAL ABUSE: DENIES

## 2025-05-08 SDOH — HEALTH STABILITY: MENTAL HEALTH: HOW MANY DRINKS CONTAINING ALCOHOL DO YOU HAVE ON A TYPICAL DAY WHEN YOU ARE DRINKING?: PATIENT DOES NOT DRINK

## 2025-05-08 SDOH — HEALTH STABILITY: MENTAL HEALTH: HOW OFTEN DO YOU HAVE A DRINK CONTAINING ALCOHOL?: NEVER

## 2025-05-08 SDOH — SOCIAL STABILITY: SOCIAL INSECURITY: WITHIN THE LAST YEAR, HAVE YOU BEEN AFRAID OF YOUR PARTNER OR EX-PARTNER?: NO

## 2025-05-08 SDOH — SOCIAL STABILITY: SOCIAL INSECURITY: ARE THERE ANY APPARENT SIGNS OF INJURIES/BEHAVIORS THAT COULD BE RELATED TO ABUSE/NEGLECT?: NO

## 2025-05-08 SDOH — SOCIAL STABILITY: SOCIAL INSECURITY: HAVE YOU HAD THOUGHTS OF HARMING ANYONE ELSE?: NO

## 2025-05-08 SDOH — HEALTH STABILITY: MENTAL HEALTH: SUICIDAL BEHAVIOR (LIFETIME): NO

## 2025-05-08 SDOH — ECONOMIC STABILITY: FOOD INSECURITY: WITHIN THE PAST 12 MONTHS, YOU WORRIED THAT YOUR FOOD WOULD RUN OUT BEFORE YOU GOT THE MONEY TO BUY MORE.: NEVER TRUE

## 2025-05-08 SDOH — SOCIAL STABILITY: SOCIAL INSECURITY: HAS ANYONE EVER THREATENED TO HURT YOUR FAMILY OR YOUR PETS?: NO

## 2025-05-08 SDOH — SOCIAL STABILITY: SOCIAL INSECURITY: VERBAL ABUSE: DENIES

## 2025-05-08 SDOH — HEALTH STABILITY: MENTAL HEALTH: WISH TO BE DEAD (PAST 1 MONTH): NO

## 2025-05-08 SDOH — ECONOMIC STABILITY: HOUSING INSECURITY: DO YOU FEEL UNSAFE GOING BACK TO THE PLACE WHERE YOU ARE LIVING?: NO

## 2025-05-08 SDOH — HEALTH STABILITY: MENTAL HEALTH: CURRENT SMOKER: 0

## 2025-05-08 SDOH — HEALTH STABILITY: MENTAL HEALTH: WERE YOU ABLE TO COMPLETE ALL THE BEHAVIORAL HEALTH SCREENINGS?: YES

## 2025-05-08 SDOH — SOCIAL STABILITY: SOCIAL INSECURITY: ABUSE SCREEN: ADULT

## 2025-05-08 SDOH — SOCIAL STABILITY: SOCIAL INSECURITY: DO YOU FEEL ANYONE HAS EXPLOITED OR TAKEN ADVANTAGE OF YOU FINANCIALLY OR OF YOUR PERSONAL PROPERTY?: NO

## 2025-05-08 SDOH — SOCIAL STABILITY: SOCIAL INSECURITY: DOES ANYONE TRY TO KEEP YOU FROM HAVING/CONTACTING OTHER FRIENDS OR DOING THINGS OUTSIDE YOUR HOME?: NO

## 2025-05-08 ASSESSMENT — ENCOUNTER SYMPTOMS
MUSCULOSKELETAL NEGATIVE: 1
CARDIOVASCULAR NEGATIVE: 1
CONSTITUTIONAL NEGATIVE: 1
SLEEP DISTURBANCE: 1
GASTROINTESTINAL NEGATIVE: 1
EYES NEGATIVE: 1
ENDOCRINE NEGATIVE: 1
RESPIRATORY NEGATIVE: 1
NEUROLOGICAL NEGATIVE: 1
HEMATOLOGIC/LYMPHATIC NEGATIVE: 1

## 2025-05-08 ASSESSMENT — LIFESTYLE VARIABLES
HOW OFTEN DO YOU HAVE A DRINK CONTAINING ALCOHOL: NEVER
SKIP TO QUESTIONS 9-10: 1
HOW OFTEN DO YOU HAVE 6 OR MORE DRINKS ON ONE OCCASION: NEVER
AUDIT-C TOTAL SCORE: 0

## 2025-05-08 ASSESSMENT — PATIENT HEALTH QUESTIONNAIRE - PHQ9
SUM OF ALL RESPONSES TO PHQ9 QUESTIONS 1 & 2: 0
1. LITTLE INTEREST OR PLEASURE IN DOING THINGS: NOT AT ALL
2. FEELING DOWN, DEPRESSED OR HOPELESS: NOT AT ALL

## 2025-05-08 ASSESSMENT — PAIN SCALES - GENERAL
PAINLEVEL_OUTOF10: 0 - NO PAIN
PAIN_LEVEL: 3

## 2025-05-08 ASSESSMENT — ACTIVITIES OF DAILY LIVING (ADL)
LACK_OF_TRANSPORTATION: NO
LACK_OF_TRANSPORTATION: NO

## 2025-05-08 NOTE — H&P
Obstetrical Admission History and Physical     Melissa German is a 30 y.o.  at 39w0d by LMP c/w 12 week scan presenting for scheduled rCS. Estimated fetal weight: 4600 g. She has had prenatal care with Dr. Churchill.    Chief Complaint: Scheduled     Assessment/Plan    Scheduled LTCS  -Admitted, consented   -Patient counseled on risks of  section including, bleeding, infection, injury to bowel, bladder, pelvic vasculature, pelvic nerves, fallopian tubes, and ovaries.   -Regional anesthesia pending platelets  -CBC on admission. T&C x1U pRBCs and 1 pack platelets, blood bank aware  -CEFM, cat 1 on admission   -Plan for routine post operative care after procedure    ITP  -Last platelets 90 yesterday from beth of 71 two weeks ago  -CBC pending on admission  -Currently on prednisone 80mg daily per heme. Plan for steroid taper to start postop (60mg x2 doses, 40mg x2, 20mg x2, 10mg x2)    Hypothyroid  -Home synthroid continued on admission     Elevated Blood Pressure  -Isolated mild range BP on admission  -Asymptomatic  -HELLP labs and P:C pending  -Not currently meeting criteria for HDP     Dispo: admit for scheduled rCS    Elyse Robles MD   Assessment & Plan  39 weeks gestation of pregnancy ([facility])        Pregnancy Problems (from 09/10/24 to present)       Problem Noted Diagnosed Resolved    Fetal macrosomia affecting management of mother, antepartum ([facility]) 2025 by Myriam Churchill MD  No    Priority:  Medium       Overview Signed 2025  2:41 PM by Myriam Churchill MD   - 36 week growth USN - EFW 3700 grams (>99%) with AC >99%           37 weeks gestation of pregnancy ([facility]) 9/10/2024 by Myriam Churchill MD  No    Priority:  Medium       Overview Addendum 2025  2:47 PM by Elyse Robles MD   Desired provider in labor: [x] CNM  [] Physician  [x] Blood Products: [x] Yes, accepts [] No, needs counseling  [x] Initial BMI: 24.76   [x] Prenatal Labs:   [x]  Cervical Cancer Screening up to date: 2024 - WNL (records scanned in)  [x] Rh status: O pos  [x] Genetic Screening:  RR NIPS, it's a BOY   [x] NT US: (11-13 wks): WNL  [x] Pregnancy dated by:  LMP c/w 7 week in-office USN     [x] Anatomy US: (19-20 wks): WNL other than low-lying placenta   [x] Federal Sterilization consent signed (if indicated): declines  [x] 1hr GCT at 24-28wks WNL  [] Fetal Surveillance (if indicated):  [x] Tdap: Declines   [x] Flu Vaccine: Declines   [x] Updated COVID vaccine recommended     [x] Breastfeeding: plans to breast feed - has pump ordered   [] Postpartum Birth control method:   [x] GBS at 36 - 37 wks: negative  [x] 39 weeks discussion of IOL vs. Expectant management: 39 weeks  [x] Mode of delivery ( anticipated ): [facility] scheduled for 39w4d on  at 8:30 with Kerline          33 weeks gestation of pregnancy ([facility]) 2025 by Daniel Gale MD  2025 by Myriam Churchill MD    Priority:  Medium             Options for delivery have been discussed with the patient and she elects for a repeat  section. The surgery has been discussed in detail. The risks, benefits, complications, alternatives, expected outcomes, potential problems during recuperation and recovery, and the risks of not performing the procedure were discussed with the patient. The patient stated understanding that the risks of a  section include, but are not limited to: death; reaction to medications; injury to bowel, bladder, ureters, uterus, cervix, vagina, and other pelvic and abdominal structures, infection; blood loss and possible need for transfusion; and potential need for more surgery, including hysterectomy. The risks of injury to the infant during  Section were also discussed. The possible need for a  Section in the future was explained. All questions were answered. There was concurrence with the planned procedure, and the patient wanted to proceed.    Admit to  inpatient status. I anticipate that this patient will require a stay exceeding at least 2 midnights for delivery and postpartum.  Proceed with the planned repeat  section.  Management of pregnancy complications, as indicated.    Subjective   30 year old  at 39w0d by LMP c/w 12 week scan presenting for scheduled rCS. Good FM. Denies HA, scotoma, SOB, or RUQ pain    Pregnancy notable for:  -Prior c/s for breech in [place] with delayed recognition of 1.5L rectus sheath hematoma requiring blood transfusion, desires rCS  -ITP, on steroid taper per heme, last platelets 90 yesterday from beth of 71 two weeks ago  -LGA, last growth US (): EFW 3700g (>99%), AC >99%; 1 hour GCT WNL  -Hypothyroid, on synthroid 50mcg daily    OBHx: c/s for breech  PMHx: as above  SurgHx: c/s x1  Meds: PNV, prednisone, synthroid  Allergies: NKDA  Social: denies t/e/I  FHx: mother with PEC     Objective    Last Vitals  Temp Pulse Resp BP MAP O2 Sat     82   (!) 147/70 100 96 %     Physical Examination  Gen: awake, alert  Head: NCAT  HEENT: moist mucus membranes  Pulm: breathing comfortably on room air  CV: warm and well-perfused  Abd: gravid  Neuro: alert and oriented  Psych: appropriate affect     FHT: 145, moderate variability, + accels, -decels  TOCO: quiet    Lab Review  Labs in chart were reviewed.

## 2025-05-08 NOTE — SIGNIFICANT EVENT
Summary of Postpartum Hemorrhage    Upon return to the room patient began having brisk bleeding vaginally. Initial loss in the room was approximately 500 ml. On examination manual and US it appeared to become from the lower uterine segment.     Patient had normal blood pressures throughout the day we gave Methergine once when the bleeding continued we administered buccal Cytotec. On exam the cervix was distended by a few clots it was elected to place the PEARL at this point.    Based on the patient's condition of ITP and past history of bleeding down to a HGB of 5 we decided to transfuse the platelets and one unit of blood based on the rate of bleeding at the time the PEARL was placed.     Following PEARL placement under ultrasound guidance there was another 200 ml of blood loss. Following the one hour of placement the balloon was deflated there was no significant bleeding at it was  removed about an hour later.     We will continue to follow closely.

## 2025-05-08 NOTE — ANESTHESIA PROCEDURE NOTES
Spinal Block    Patient location during procedure: OB  Start time: 5/8/2025 8:37 AM  End time: 5/8/2025 8:44 AM  Reason for block: primary anesthetic  Staffing  Performed: CRNA   Authorized by: EUFEMIA Oswald    Performed by: EUFEMIA Oswald    Preanesthetic Checklist  Completed: patient identified, IV checked, risks and benefits discussed, surgical consent, pre-op evaluation, timeout performed and sterile techniques followed  Block Timeout  RN/Licensed healthcare professional reads aloud to the Anesthesia provider and entire team: Patient identity, procedure with side and site, patient position, and as applicable the availability of implants/special equipment/special requirements.  Patient on coagulant treatment: no  Timeout performed at: 5/8/2025 8:37 AM  Spinal Block  Patient position: sitting  Prep: DuraPrep  Sterility prep: mask, hand hygiene, gloves, drape and cap  Sedation level: no sedation  Patient monitoring: heart rate, continuous pulse oximetry and blood pressure  Approach: midline  Vertebral space: L2-3  Injection technique: single-shot  Needle  Needle type: pencil-point   Needle gauge: 25 G  Needle length: 3.5 in  Free flowing CSF: yes    Assessment  Sensory level: T4 bilateral  Block outcome: Allis test negative  Procedure assessment: patient tolerated procedure well with no immediate complications  Additional Notes  First attempt, easy placement.  Lot [id number]

## 2025-05-08 NOTE — ANESTHESIA POSTPROCEDURE EVALUATION
Patient: Melissa German    Procedure Summary       Date: 25 Room / Location: [facility]  [facility]    Anesthesia Start: 832 Anesthesia Stop:     Procedure:  Section Diagnosis:       39 weeks gestation of pregnancy ([facility])      (39 weeks gestation of pregnancy ([facility]) [Z3A.39])    Surgeons: Randall Dooley MD Responsible Provider: EUFEMIA Oswald    Anesthesia Type: spinal ASA Status: 2            Anesthesia Type: spinal    Vitals Value Taken Time   /64 25 10:12   Temp 36 25 10:20   Pulse 78 25 10:18   Resp 18 25 10:20   SpO2 99 % 25 10:18       Anesthesia Post Evaluation    Patient location during evaluation: bedside  Patient participation: complete - patient participated  Level of consciousness: awake and alert  Pain score: 3  Pain management: adequate  Multimodal analgesia pain management approach  Airway patency: patent  Two or more strategies used to mitigate risk of obstructive sleep apnea  Cardiovascular status: acceptable  Respiratory status: acceptable  Hydration status: acceptable  Postoperative Nausea and Vomiting: none        No notable events documented.

## 2025-05-08 NOTE — L&D DELIVERY NOTE
Birth Operative Report    Patient Name: Melissa German  : 1995  MRN: [id number]  Age: 30 y.o.    /Para:   Gestational Age: 39w0d    Date of Surgery: 2025    Operating Room Location: [place] [address]    Pre-op Diagnosis:  Intrauterine Pregnancy at 39w0d  Prior History of  Section  ITP with prior rectus sheath hematoma    Post-op Diagnosis:  Same    Procedure:   Repeat Low Transverse  Section    Surgery Category at [facility] Time: Confirmed Scheduled, Non-urgent    Surgeon:    * Randall Dooley - Primary    Resident/Fellow/Other Assistant:   Surgeons and Role:  * No surgeons found with a matching role *    Anesthesia:  Type: spinal     CRNA: ESPERANZA Oswald-CRNA    Surgical Staff:  Circulator: Bianca Terrazas RN  Scrub Person: Mary Shipley SA; Frances Diaz RN     Preoperative Antibiotics: Ancef 2 g    Indication for Procedure:   30 y.o.  at 39w0d  Patient had declined .    Informed Consent:  The risks, benefits, complications, and alternatives were discussed with the patient. The patient understood that the risks of  section include but are not limited to infection, bleeding, injury to nearby structures or organs, possible need for transfusion, and potential need for more surgery. The patient stated understanding and desired to proceed. All questions were answered. The site of surgery was properly noted and marked. The patient's identity was confirmed, and the procedure verified as a  delivery. A Time Out was held and the above information confirmed.     Findings:   Normal appearing gravid uterus, fallopian tubes, and ovaries. Amniotic fluid Meconium, Male infant in       presentation, APGARS 9 , 10 .  Birth Weight 4.45 kg.    Description of Procedure:   Patient was taken to the OR where regional anesthesia was found to be adequate.  She was then placed in the dorsal supine position with a left lateral tilt. A escalera catheter was placed,  SCDs were applied, and a vaginal prep was performed. A pre-procedure time out was performed. The patient was given a prophylactic dose of IV antibiotics.  She was then prepped and draped in the usual sterile fashion.     A Pfannenstiel skin incision was made with the scalpel through the skin and subcutaneous fat to the underlying fascial layer. The fascia was incised in the midline with the scalpel and the incision was extended bilaterally. The superior aspect of the incision was grasped, tented up with Kocher clamps and the rectus muscle was dissected off. The muscles were divided in the midline, the peritoneum was then identified and entered bluntly and incision extended superiorly and inferiorly taking care to avoid underlying viscera.  The bladder blade was inserted.       Uterine Incision was made with the scalpel, the uterine cavity was entered, and the hysterotomy was extended cephalocaudally by stretching. The infant was delivered atraumatically, the cord was clamped and cut and infant was handed off to awaiting nursing.  A cord segment and blood sample were collected. The placenta was then expressed. The uterus was cleared of all clot and debris. The uterine incision was repaired using a running locked stitch of 0-Monocry in two layers. Adequate hemostasis was noted.    The pelvis was irrigated. The hysterotomy was again evaluated and found to be hemostatic. Lynne was placed over the incision line. There were some omental adhesions to the anterior wall which were taken clamped transected and tied off with 0-Vicryl suture. The peritoneum was then closed with 2-0 Vicryl. The underside of the fascia and bladder and the rectus muscles were also found to be hemostatic. The fascia was closed using a running stitch of 0-Vicryl.  The subcutaneous layer was irrigated, small bleeders were cauterized. The subcutaneous layer was re-approximated using a running stitch of 3-0 Vicryl. The skin was closed with 4-0 Vicryl.          * Randall Dooley - Primary was present and performed the procedure.    Complications:           Anesthesia Record               Intraprocedure I/O Totals          Intake    Tranexamic Acid 10.00 mL    The total shown is the total volume documented since Anesthesia Start was filed.    Oxytocin Drip 450.00 mL    The total shown is the total volume documented since Anesthesia Start was filed.    Phenylephrine Drip 22.00 mL    The total shown is the total volume documented since Anesthesia Start was filed.    lactated Ringer's infusion 500.00 mL    ceFAZolin (Ancef) 2 g in dextrose (iso)  mL 100.00 mL    Total Intake 1082 mL       Output    Urine 100 mL    Total Output 100 mL       Net    Net Volume 982 mL            Blood products: None during the procedure     Blood Product Administration History         Date Volume Status    Transfuse RBC :1 Hour 2025 132.92 mL Transfusing    Transfuse Platelets :30 Minutes 2025 1224 mL Completed 25 1159            Uterotonics/Hemostatic Agent: IV Pitocin 30 units and IV TXA 1000 mg    Specimen:   Placenta  Delivered: 2025  9:10 AM  Appearance: Intact  Removal: Manual removal    Disposition: discarded    Sponge/Instrument/Needle Counts: The sponge, lap and needle counts were correct.    Patient Disposition: Patient recovering on labor and delivery in stable condition.    Additional Procedures:  None    Task Performed by: RN or PA Surgical Assistant: N/A      Sea Mejias [[id number]]      Labor Events    Sac identifier: Sac 1  Rupture date/time: 2025 0907  Rupture type: Artificial  Fluid color: Meconium  Fluid odor: None  Labor type:  Without Labor  Labor allowed to proceed with plans for an attempted vaginal birth?: No       Labor Length    3rd stage: 0h 02m       Placenta    Placenta delivery date/time: 2025 09:10  Placenta removal: Manual removal  Placenta appearance: Intact  Placenta disposition: discarded       Anesthesia     Method: Spinal        Delivery    Birth date/time: 2025 09:08:00  Delivery type: , Low Vertical   categorization: repeat   priority: scheduled       Resuscitation    Method: None       Apgars    Living status: Living  Apgar Component Scores:  1 min.:  5 min.:  10 min.:  15 min.:  20 min.:    Skin color:  1  2       Heart rate:  2  2       Reflex irritability:  2  2       Muscle tone:  2  2       Respiratory effort:  2  2       Total:  9  10       Apgars assigned by: MANISH       Delivery Providers    Delivering clinician: aRndall Dooley MD   Provider Role    Bianca Terrazas RN Delivery Nurse    Sol Crump, RN Nursery Nurse     Resident

## 2025-05-08 NOTE — LACTATION NOTE
Lactation Consultant Note  Lactation Consultation  Reason for Consult: Initial assessment  Consultant Name: Pia Leon RN IBCLC    Maternal Information       Maternal Assessment       Infant Assessment       Feeding Assessment       LATCH TOOL       Breast Pump       Other OB Lactation Tools       Patient Follow-up       Other OB Lactation Documentation       Recommendations/Summary  Family aware per RN staff that RN IBCLC on unit and available when consult desired. Encouraged to call

## 2025-05-08 NOTE — ANESTHESIA PREPROCEDURE EVALUATION
Patient: Melissa German    Evaluation Method: In-person visit    Procedure Information       Date/Time: 25    Procedure:  Section - rCS at 39w1d, 1pm, Kerline brown do    Location: [facility] OB 01 / Virtual [facility]    Surgeons: Randall Dooley MD            Relevant Problems   Endocrine   (+) Hypothyroidism      Hematology   (+) Idiopathic thrombocytopenic purpura (Multi)   (+) Thrombocytopenia (CMS-HCC)      GYN   (+) 37 weeks gestation of pregnancy (HHS-HCC)   (+) 39 weeks gestation of pregnancy (HHS-HCC)       Clinical information reviewed:   Tobacco  Allergies    Med Hx  Surg Hx   Fam Hx          NPO Detail:  No data recorded     OB/GYN     Physical Exam    Airway  Mallampati: III  TM distance: >3 FB  Mouth opening: 3 or more finger widths     Cardiovascular    Dental    Pulmonary    Abdominal            Anesthesia Plan    History of general anesthesia?: yes  History of complications of general anesthesia?: no    ASA 2     spinal   (I informed and discussed the risks and benefits of general, spinal and epidural anesthesia with the patient.  The patient expressed her understanding and her questions were answered.  A verbal consent was given by the patient.  )  The patient is not a current smoker.    Anesthetic plan and risks discussed with patient.  Use of blood products discussed with patient who consented to blood products.

## 2025-05-09 LAB
ERYTHROCYTE [DISTWIDTH] IN BLOOD BY AUTOMATED COUNT: 13.5 % (ref 11.5–14.5)
HCT VFR BLD AUTO: 29.8 % (ref 36–46)
HGB BLD-MCNC: 9.7 G/DL (ref 12–16)
MCH RBC QN AUTO: 27.7 PG (ref 26–34)
MCHC RBC AUTO-ENTMCNC: 32.6 G/DL (ref 32–36)
MCV RBC AUTO: 85 FL (ref 80–100)
NRBC BLD-RTO: 0 /100 WBCS (ref 0–0)
PLATELET # BLD AUTO: 146 X10*3/UL (ref 150–450)
RBC # BLD AUTO: 3.5 X10*6/UL (ref 4–5.2)
WBC # BLD AUTO: 23.3 X10*3/UL (ref 4.4–11.3)

## 2025-05-09 PROCEDURE — 36415 COLL VENOUS BLD VENIPUNCTURE: CPT | Performed by: OBSTETRICS & GYNECOLOGY

## 2025-05-09 PROCEDURE — 1220000001 HC OB SEMI-PRIVATE ROOM DAILY

## 2025-05-09 PROCEDURE — 2500000002 HC RX 250 W HCPCS SELF ADMINISTERED DRUGS (ALT 637 FOR MEDICARE OP, ALT 636 FOR OP/ED): Performed by: OBSTETRICS & GYNECOLOGY

## 2025-05-09 PROCEDURE — 2500000001 HC RX 250 WO HCPCS SELF ADMINISTERED DRUGS (ALT 637 FOR MEDICARE OP): Performed by: OBSTETRICS & GYNECOLOGY

## 2025-05-09 PROCEDURE — 2500000004 HC RX 250 GENERAL PHARMACY W/ HCPCS (ALT 636 FOR OP/ED): Performed by: OBSTETRICS & GYNECOLOGY

## 2025-05-09 PROCEDURE — 85027 COMPLETE CBC AUTOMATED: CPT | Performed by: OBSTETRICS & GYNECOLOGY

## 2025-05-09 RX ADMIN — PREDNISONE 60 MG: 50 TABLET ORAL at 09:32

## 2025-05-09 RX ADMIN — ACETAMINOPHEN 975 MG: 325 TABLET ORAL at 10:58

## 2025-05-09 RX ADMIN — LEVOTHYROXINE SODIUM 50 MCG: 50 TABLET ORAL at 06:22

## 2025-05-09 RX ADMIN — ACETAMINOPHEN 975 MG: 325 TABLET ORAL at 04:28

## 2025-05-09 RX ADMIN — ACETAMINOPHEN 975 MG: 325 TABLET ORAL at 22:32

## 2025-05-09 RX ADMIN — ACETAMINOPHEN 975 MG: 325 TABLET ORAL at 16:05

## 2025-05-09 ASSESSMENT — PAIN SCALES - GENERAL
PAINLEVEL_OUTOF10: 0 - NO PAIN
PAINLEVEL_OUTOF10: 5 - MODERATE PAIN
PAINLEVEL_OUTOF10: 2
PAINLEVEL_OUTOF10: 0 - NO PAIN

## 2025-05-09 ASSESSMENT — PAIN DESCRIPTION - DESCRIPTORS
DESCRIPTORS: CRAMPING
DESCRIPTORS: SORE

## 2025-05-09 NOTE — CARE PLAN
The patient's goals for the shift include      The clinical goals for the shift include fhr remains reassuring    Over the shift, the patient did make progress toward the following goals.

## 2025-05-09 NOTE — PROGRESS NOTES
Patient: Melissa German    Vitals Value Taken Time   /59 05/08/25 12:12   Temp 36.5 °C (97.7 °F) 05/08/25 11:57   Pulse 90 05/08/25 12:12   Resp 16 05/08/25 12:12   SpO2 100 % 05/08/25 12:12       [unfilled]    Epidural catheter removed by nursing. No redness, swelling, or drainage at puncture site.    Complete resolution of numbness. Patient is able to lift legs, bend at the knees, and ambulate.    Patient denies problems with urination.    Patient denies nausea, headache or severe back pain.

## 2025-05-09 NOTE — LACTATION NOTE
"Lactation Consultant Note  Lactation Consultation  Reason for Consult: Initial assessment  Consultant Name: Pia Oneal RN IBCLC    Maternal Information       Maternal Assessment       Infant Assessment       Feeding Assessment       LATCH TOOL       Breast Pump       Other OB Lactation Tools       Patient Follow-up       Other OB Lactation Documentation       Recommendations/Summary  Family aware LC on unit and available if desired. Rn staff and patient endorse mother is experienced and breastfeeding is going well. To call LC as desired.    Update 1800: Mother called for LC to room at this time.   Mother/\"Melissa\" is 31yo Repeat  on 25 at 0908 fro viable boy \"Chris\" at 39weeks gestation. Mother has hx of complicated birth in [place], hx od thrombocytopenia, idiopathic thrombocytopenic purpura, hypothryroidism, Hemorrhage after this delivery as well 2702 QBL, delayed lactogenesis II after last delivery. NB BW 4450, 4146 6.84% APGARS 9&10 3.9 at 1day and 6HOL. Mother  her previous child - daughter.\"Alley\" born 23 for 2years. Mother endorses breastfeeding is going well and endorses being highly recommended to [facility] LC group from friends. Mother has few questions regarding possible delay in milk supply again with this delivery and LC reviewed with her - risk factors and out pt resources and support.  Cue based feeding, at least 8-12 times in 24 hours  Frequent skin to skin  Hand express for extra volume  Call for assistance as needed   Educated parents on skin to skin, hand expression, hunger cues and feeding frequency/patterns. Discussed expected output, weight loss <10%, and normal bilirubin. Educated on AAP pacifier recommendations. Reviewed outpatient resources.   "

## 2025-05-09 NOTE — PROGRESS NOTES
Postpartum Progress Note    Assessment/Plan   Melissa German is a 30 y.o.,  who delivered at 39w0d gestation via scheduled repeat  section. She is now postpartum day 1.    - Continue routine postpartum care  - Breast feeding.  Going well.   - Male infant.  Doing well. Declines circumcision.   - PPH/ITP - s/p 1 unit PRBCs and 1 pack platelets yesterday.  Hemoglobin this morning 9.7.  Pt asymptomatic. Plt 146.  Some blood noted on dressing.  Continue to monitor.   - Discharge home on POD 2-3 pending post-operative milestones  - Follow up in 1-2 weeks for incision check     Lab Results   Component Value Date    LABRH POS 2025     Lab Results   Component Value Date    RUBIG Positive 10/21/2024       Assessment & Plan  39 weeks gestation of pregnancy ([facility])    Pregnancy Problems (from 09/10/24 to present)       Problem Noted Diagnosed Resolved    Fetal macrosomia affecting management of mother, antepartum ([facility]) 2025 by Myriam Churchill MD  No    Priority:  Medium       Overview Signed 2025  2:41 PM by Myriam Churchill MD   - 36 week growth USN - EFW 3700 grams (>99%) with AC >99%           37 weeks gestation of pregnancy ([facility]) 9/10/2024 by Myriam Churchill MD  No    Priority:  Medium       Overview Addendum 2025  2:47 PM by Elyse Robles MD   Desired provider in labor: [x] CNM  [] Physician  [x] Blood Products: [x] Yes, accepts [] No, needs counseling  [x] Initial BMI: 24.76   [x] Prenatal Labs:   [x] Cervical Cancer Screening up to date: 2024 - WNL (records scanned in)  [x] Rh status: O pos  [x] Genetic Screening:  RR NIPS, it's a BOY   [x] NT US: (11-13 wks): WNL  [x] Pregnancy dated by:  LMP c/w 7 week in-office USN     [x] Anatomy US: (19-20 wks): WNL other than low-lying placenta   [x] Federal Sterilization consent signed (if indicated): declines  [x] 1hr GCT at 24-28wks WNL  [] Fetal Surveillance (if indicated):  [x] Tdap: Declines   [x] Flu  Vaccine: Declines   [x] Updated COVID vaccine recommended     [x] Breastfeeding: plans to breast feed - has pump ordered   [] Postpartum Birth control method:   [x] GBS at 36 - 37 wks: negative  [x] 39 weeks discussion of IOL vs. Expectant management: 39 weeks  [x] Mode of delivery ( anticipated ): [facility] scheduled for 39w4d on 5/12 at 8:30 with Kerline          33 weeks gestation of pregnancy ([facility]) 4/12/2025 by Daniel Gale MD  4/18/2025 by Myriam Churchill MD    Priority:  Medium             Hospital course: postpartum hemorrhage   Subjective   Pt doing well after delivery.  Moderate lochia. Ambulating and voiding without issue.  Denies nausea/vomiting.  She is passing flatus. Her pain is well controlled overall.  She is not taking narcotic pain medication. Breastfeeding is going well overall.     Objective   Allergies:   Patient has no known allergies.         Last Vitals:  /72   Pulse 77   Temp 36.7 °C (98 °F) (Oral)   Resp 15   LMP 08/08/2024 (Exact Date)   SpO2 97%   Breastfeeding Yes     Physical Exam:  General: Examination reveals a well developed, well nourished, female, in no acute distress. She is alert and cooperative.  HEENT: PERRLA.  Lungs: Respirations unlabored  Abdomen: Soft, nondistended, appropriately tender to palpation for post-operative course, no rebound or guarding   Incision: Has dressing in place - some blood noted on dressing   Extremities: no redness or tenderness in the calves or thighs, no edema  Psychological: awake and alert; oriented to person, place, and time    Lab Data:  Lab Results   Component Value Date    HGB 9.7 (L) 05/09/2025    HGB 11.8 (L) 05/08/2025

## 2025-05-09 NOTE — PROGRESS NOTES
Spiritual Care Visit  Spiritual Care Request    Reason for Visit:  Routine Visit: Introduction  Continue Visiting: Yes     Request Received From:       Focus of Care:  Visited With: Patient and family together         Refer to :          Spiritual Care Assessment    Spiritual Assessment:                      Care Provided:       Sense of Community and or Baptist Affiliation:  Hoahaoism         Addressed Needs/Concerns and/or [place] Through:          Outcome:        Advance Directives:         Spiritual Care Annotation    Annotation:  Visited mom and baby and some other family members - gave her a framed picture gift for her baby

## 2025-05-10 PROCEDURE — 1220000001 HC OB SEMI-PRIVATE ROOM DAILY

## 2025-05-10 PROCEDURE — 2500000005 HC RX 250 GENERAL PHARMACY W/O HCPCS: Performed by: OBSTETRICS & GYNECOLOGY

## 2025-05-10 PROCEDURE — 2500000002 HC RX 250 W HCPCS SELF ADMINISTERED DRUGS (ALT 637 FOR MEDICARE OP, ALT 636 FOR OP/ED): Performed by: OBSTETRICS & GYNECOLOGY

## 2025-05-10 PROCEDURE — 2500000004 HC RX 250 GENERAL PHARMACY W/ HCPCS (ALT 636 FOR OP/ED): Performed by: OBSTETRICS & GYNECOLOGY

## 2025-05-10 PROCEDURE — 2500000001 HC RX 250 WO HCPCS SELF ADMINISTERED DRUGS (ALT 637 FOR MEDICARE OP): Performed by: OBSTETRICS & GYNECOLOGY

## 2025-05-10 RX ADMIN — ACETAMINOPHEN 975 MG: 325 TABLET ORAL at 22:32

## 2025-05-10 RX ADMIN — LEVOTHYROXINE SODIUM 50 MCG: 50 TABLET ORAL at 06:11

## 2025-05-10 RX ADMIN — ACETAMINOPHEN 975 MG: 325 TABLET ORAL at 04:25

## 2025-05-10 RX ADMIN — PREDNISONE 40 MG: 20 TABLET ORAL at 08:51

## 2025-05-10 RX ADMIN — ACETAMINOPHEN 975 MG: 325 TABLET ORAL at 16:52

## 2025-05-10 RX ADMIN — OXYCODONE HYDROCHLORIDE 5 MG: 5 TABLET ORAL at 18:11

## 2025-05-10 RX ADMIN — LIDOCAINE 4% 1 PATCH: 40 PATCH TOPICAL at 10:31

## 2025-05-10 RX ADMIN — OXYCODONE HYDROCHLORIDE 5 MG: 5 TABLET ORAL at 07:30

## 2025-05-10 RX ADMIN — ACETAMINOPHEN 975 MG: 325 TABLET ORAL at 10:31

## 2025-05-10 ASSESSMENT — PAIN SCALES - GENERAL
PAINLEVEL_OUTOF10: 4
PAINLEVEL_OUTOF10: 0 - NO PAIN
PAINLEVEL_OUTOF10: 3
PAINLEVEL_OUTOF10: 3
PAINLEVEL_OUTOF10: 0 - NO PAIN

## 2025-05-10 ASSESSMENT — PAIN DESCRIPTION - DESCRIPTORS
DESCRIPTORS: SORE
DESCRIPTORS: SORE

## 2025-05-10 NOTE — CARE PLAN
Problem: Pain - Adult  Goal: Verbalizes/displays adequate comfort level or baseline comfort level  Outcome: Progressing  Flowsheets (Taken 5/10/2025 1712)  Verbalizes/displays adequate comfort level or baseline comfort level:   Encourage patient to monitor pain and request assistance   Administer analgesics based on type and severity of pain and evaluate response   Consider cultural and social influences on pain and pain management   Assess pain using appropriate pain scale   Implement non-pharmacological measures as appropriate and evaluate response   Notify Licensed Independent Practitioner if interventions unsuccessful or patient reports new pain     Problem: Safety - Adult  Goal: Free from fall injury  Outcome: Progressing  Flowsheets (Taken 5/10/2025 1712)  Free from fall injury: Instruct family/caregiver on patient safety     Problem: Discharge Planning  Goal: Discharge to home or other facility with appropriate resources  Outcome: Progressing  Flowsheets (Taken 5/10/2025 1712)  Discharge to home or other facility with appropriate resources: Identify barriers to discharge with patient and caregiver

## 2025-05-10 NOTE — LACTATION NOTE
Lactation Consultant Note  Lactation Consultation  Reason for Consult: Initial assessment  Consultant Name: Rica Fraser    Maternal Information  Has mother  before?: Yes  How long did the mother previously breastfeed?: Older child age 2.5,  until 22 months  Previous Maternal Breastfeeding Challenges: None  Infant to breast within first 2 hours of birth?: Yes  Exclusive Pump and Bottle Feed: No    Maternal Assessment  Breast Assessment: Medium  Nipple Assessment: Intact  [place] Assessment: Normal    Infant Assessment  Infant Behavior: Awake, Sucking    Feeding Assessment  Nutrition Source: Breastmilk  Feeding Method: Nursing at the breast  Feeding Position: Baby led, Laid back, Mother demonstrates good positioning  Suck/Feeding: Sustained, Audible swallowing, Does not suckle  Latch Assessment: Latch achieved, Wide open mouth < 160, Bursts of sucking, swallowing, and rest    LATCH TOOL  Latch: Grasps breast, tongue down, lips flanged, rhythmic sucking  Audible Swallowing: Spontaneous and intermittent (24 hours old)  Type of Nipple: Everted (After stimulation)  Comfort (Breast/Nipple): Soft/non-tender  Hold (Positioning): No assist from staff, mother able to position/hold infant  LATCH Score: 10    Breast Pump  Pump: None    Other OB Lactation Tools       Patient Follow-up  Outpatient Lactation Follow-up: Recommended  Lactation Professional - OK to Discharge: Yes    Other OB Lactation Documentation  Maternal Risk Factors: Hypothyroidism, Significant hemorrhage,  delivery, Other (comment) (Ideopathic thrombocytopenia purpura)  Infant Risk Factors: High birth weight >3600 g, Other (comment) (low BG x1, OGG given)    Recommendations/Summary  See previous note for history. 8.7% weight loss, 90th% on NEWT, trending better. TCB 6.4@43 hours. Mother endorses infant latching well, feeding frequently. Denies any nipple soreness or pain. Infant latched in laid back hold at time of consult. Wide gape,  flanged lips, bursts of sucking, swallowing, and rest.     Discharge teaching reviewed. Taught engorgement management. Reviewed s/s of plugged ducts and mastitis and treatment. Reviewed normal feeding patterns of the “4th trimester” and outpatient support.

## 2025-05-10 NOTE — PROGRESS NOTES
Postpartum Progress Note    Assessment/Plan   Melissa German is a 30 y.o., , who delivered at 39w0d gestation    Now PPD#2 s/p , Low Vertical on 2025 scheduled repeat complicated by postpartum hemorrhage/ITP.    Post-operative  - Continue routine postpartum care  - Pain well controlled on po medications  - DVT risk score DVT Score (IF A SCORE IS NOT CALCULATING, MUST SELECT A BMI TO COMPLETE): 5 , lovenox held in setting of thrombocytopenia/ITP  - Hb  11.9   --> 9.7   , acute blood loss anemia, asymptomatic, appropriate for blood loss over 2 L and 1 pack platelets  Results from last 7 days   Lab Units 25  0620 25  1734 25  1110   WBC AUTO x10*3/uL 23.3* 21.8* 14.1*   HEMOGLOBIN g/dL 9.7* 11.8* 11.9*   PLATELETS AUTO x10*3/uL 146* 147* 105*   MCV fL 85 87 86         Postpartum  Rh  positive  rubella immune  Breastfeeding, lactation consult    Mild range BP  One mild range BP on admission, normotensive, has not met crieria for hypertensive disorder of pregnancy    ITP  On steroid taper, last platelet count 146, 40 mg dexamethasone today and tmw, then 20 for two days, then 10 for 2 days  No NSAIDS  Lovenox held      Brittany Thompson MD     Assessment & Plan  39 weeks gestation of pregnancy ([facility])    Pregnancy Problems (from 09/10/24 to present)       Problem Noted Diagnosed Resolved    Fetal macrosomia affecting management of mother, antepartum ([facility]) 2025 by Myriam Churchill MD  No    Priority:  Medium       Overview Signed 2025  2:41 PM by Myriam Churchill MD   - 36 week growth USN - EFW 3700 grams (>99%) with AC >99%           37 weeks gestation of pregnancy ([facility]) 9/10/2024 by Myriam Churchill MD  No    Priority:  Medium       Overview Addendum 2025  2:47 PM by Elyse Robles MD   Desired provider in labor: [x] CNM  [] Physician  [x] Blood Products: [x] Yes, accepts [] No, needs counseling  [x] Initial BMI: 24.76   [x] Prenatal  Labs:   [x] Cervical Cancer Screening up to date: 2024 - WNL (records scanned in)  [x] Rh status: O pos  [x] Genetic Screening:  RR NIPS, it's a BOY   [x] NT US: (11-13 wks): WNL  [x] Pregnancy dated by:  LMP c/w 7 week in-office USN     [x] Anatomy US: (19-20 wks): WNL other than low-lying placenta   [x] Federal Sterilization consent signed (if indicated): declines  [x] 1hr GCT at 24-28wks WNL  [] Fetal Surveillance (if indicated):  [x] Tdap: Declines   [x] Flu Vaccine: Declines   [x] Updated COVID vaccine recommended     [x] Breastfeeding: plans to breast feed - has pump ordered   [] Postpartum Birth control method:   [x] GBS at 36 - 37 wks: negative  [x] 39 weeks discussion of IOL vs. Expectant management: 39 weeks  [x] Mode of delivery ( anticipated ): [facility] scheduled for 39w4d on  at 8:30 with Kerline          33 weeks gestation of pregnancy ([facility]) 2025 by Daniel Gale MD  2025 by Myriam Churchill MD    Priority:  Medium                 Subjective      Melissa German is PPD#2 s/p  who reports feeling overall well.  No acute events overnight.  Voiding spontaneously, passing flatus.  Pain well controlled on PO meds.  Light lochia. Tolerating diet.   Does not endorse lightheadedness or palpitations or dyspnea.      Objective   Allergies:   Patient has no known allergies.         Last Vitals:  Temp Pulse Resp BP MAP Pulse Ox   36.8 °C (98.2 °F) 84 15 127/78   96 %     Vitals Min/Max Last 24 Hours:  Temp  Min: 36.8 °C (98.2 °F)  Max: 36.9 °C (98.4 °F)  Pulse  Min: 84  Max: 97  Resp  Min: 15  Max: 18  BP  Min: 112/64  Max: 127/78    Intake/Output:   No intake or output data in the 24 hours ending 05/10/25 1302    Physical Exam:  General: examination reveals a well developed, well nourished, female, in no acute distress. She is alert and cooperative.  HEENT: external ears normal. Nose normal, no erythema or discharge.  Neck: supple, no significant adenopathy  Lungs: breathing even  and unlabored  Cardiac: warm and well perfused  Abdominal: soft, fundus firm, below umbilicus, nontender  Incision: mepilex dressing intact some what saturated, no surrounding ecchymoses or drainage, will change nurses change  Extremities: no redness or tenderness in the calves or thighs, tr edema.  Neurological: alert, oriented, normal speech, no focal findings or movement disorder noted.

## 2025-05-11 VITALS
OXYGEN SATURATION: 96 % | SYSTOLIC BLOOD PRESSURE: 126 MMHG | HEART RATE: 107 BPM | TEMPERATURE: 98 F | DIASTOLIC BLOOD PRESSURE: 76 MMHG | RESPIRATION RATE: 15 BRPM

## 2025-05-11 PROCEDURE — 2500000004 HC RX 250 GENERAL PHARMACY W/ HCPCS (ALT 636 FOR OP/ED): Performed by: OBSTETRICS & GYNECOLOGY

## 2025-05-11 PROCEDURE — 2500000002 HC RX 250 W HCPCS SELF ADMINISTERED DRUGS (ALT 637 FOR MEDICARE OP, ALT 636 FOR OP/ED): Performed by: OBSTETRICS & GYNECOLOGY

## 2025-05-11 PROCEDURE — 2500000005 HC RX 250 GENERAL PHARMACY W/O HCPCS: Performed by: OBSTETRICS & GYNECOLOGY

## 2025-05-11 PROCEDURE — 2500000001 HC RX 250 WO HCPCS SELF ADMINISTERED DRUGS (ALT 637 FOR MEDICARE OP): Performed by: OBSTETRICS & GYNECOLOGY

## 2025-05-11 RX ORDER — OXYCODONE HYDROCHLORIDE 5 MG/1
5 TABLET ORAL EVERY 4 HOURS PRN
Qty: 15 TABLET | Refills: 0 | Status: SHIPPED | OUTPATIENT
Start: 2025-05-11

## 2025-05-11 RX ORDER — LIDOCAINE 560 MG/1
1 PATCH PERCUTANEOUS; TOPICAL; TRANSDERMAL
Qty: 3 PATCH | Refills: 0 | Status: SHIPPED | OUTPATIENT
Start: 2025-05-11

## 2025-05-11 RX ADMIN — PREDNISONE 40 MG: 20 TABLET ORAL at 08:37

## 2025-05-11 RX ADMIN — LEVOTHYROXINE SODIUM 50 MCG: 50 TABLET ORAL at 06:05

## 2025-05-11 RX ADMIN — OXYCODONE HYDROCHLORIDE 5 MG: 5 TABLET ORAL at 04:34

## 2025-05-11 RX ADMIN — LIDOCAINE 4% 1 PATCH: 40 PATCH TOPICAL at 10:39

## 2025-05-11 RX ADMIN — ACETAMINOPHEN 975 MG: 325 TABLET ORAL at 10:39

## 2025-05-11 RX ADMIN — ACETAMINOPHEN 975 MG: 325 TABLET ORAL at 04:35

## 2025-05-11 ASSESSMENT — PAIN SCALES - GENERAL
PAINLEVEL_OUTOF10: 0 - NO PAIN
PAINLEVEL_OUTOF10: 0 - NO PAIN

## 2025-05-11 NOTE — CARE PLAN
Problem: Pain - Adult  Goal: Verbalizes/displays adequate comfort level or baseline comfort level  Outcome: Met  Flowsheets (Taken 5/10/2025 1712)  Verbalizes/displays adequate comfort level or baseline comfort level:   Encourage patient to monitor pain and request assistance   Administer analgesics based on type and severity of pain and evaluate response   Consider cultural and social influences on pain and pain management   Assess pain using appropriate pain scale   Implement non-pharmacological measures as appropriate and evaluate response   Notify Licensed Independent Practitioner if interventions unsuccessful or patient reports new pain     Problem: Safety - Adult  Goal: Free from fall injury  Outcome: Met  Flowsheets (Taken 5/10/2025 1712)  Free from fall injury: Instruct family/caregiver on patient safety     Problem: Discharge Planning  Goal: Discharge to home or other facility with appropriate resources  Outcome: Met  Flowsheets (Taken 5/10/2025 1712)  Discharge to home or other facility with appropriate resources: Identify barriers to discharge with patient and caregiver

## 2025-05-11 NOTE — CARE PLAN
Problem: Pain - Adult  Goal: Verbalizes/displays adequate comfort level or baseline comfort level  Outcome: Progressing  Flowsheets (Taken 5/10/2025 1712 by Marysol Moore RN)  Verbalizes/displays adequate comfort level or baseline comfort level:   Encourage patient to monitor pain and request assistance   Administer analgesics based on type and severity of pain and evaluate response   Consider cultural and social influences on pain and pain management   Assess pain using appropriate pain scale   Implement non-pharmacological measures as appropriate and evaluate response   Notify Licensed Independent Practitioner if interventions unsuccessful or patient reports new pain     Problem: Safety - Adult  Goal: Free from fall injury  Outcome: Progressing  Flowsheets (Taken 5/10/2025 1712 by Marysol Moore RN)  Free from fall injury: Instruct family/caregiver on patient safety     Problem: Discharge Planning  Goal: Discharge to home or other facility with appropriate resources  Outcome: Progressing  Flowsheets (Taken 5/10/2025 1712 by Marysol Moore, RN)  Discharge to home or other facility with appropriate resources: Identify barriers to discharge with patient and caregiver   The patient's goals for the shift include get some rest    The clinical goals for the shift include return to pre-pregnancy state

## 2025-05-11 NOTE — DISCHARGE SUMMARY
Discharge Summary    Admission Date: 2025  Discharge Date: 2025     Discharge Diagnosis  39 weeks gestation of pregnancy ([facility])    Hospital Course  Delivery Date: 2025 9:08 AM  Delivery type: , Low Vertical   GA at delivery: 39w0d  Outcome: Living  Anesthesia during delivery: Spinal  Intrapartum complications:    Feeding method: Breastfeeding Status: Yes     Procedures:  Section, PEARL placement, blood transfusion  Contraception at discharge: none      Pertinent Physical Exam At Time of Discharge  General: Examination reveals a well developed, well nourished, female, in no acute distress. She is alert and cooperative.  Lungs: Normal effort.  Cardiac: Normal rate.  Abdomen: soft, ND, no bruising noted.  Incision: healing well.  Extremities: no redness or tenderness in the calves or thighs, no edema.  Neurological: alert, oriented, normal speech, no focal findings or movement disorder noted.  Psychological: awake and alert; oriented to person, place, and time.    Discharge Meds     Your medication list        START taking these medications        Instructions Last Dose Given Next Dose Due   lidocaine 4 % patch      Place 1 patch over 12 hours on the skin every 24 (twenty four) hours if needed for mild pain (1 - 3). Remove & discard patch within 12 hours or as directed by MD.       oxyCODONE 5 mg immediate release tablet  Commonly known as: Roxicodone      Take 1 tablet (5 mg) by mouth every 4 hours if needed (Pain score 6-8).              CONTINUE taking these medications        Instructions Last Dose Given Next Dose Due   levothyroxine 50 mcg tablet  Commonly known as: Synthroid, Levoxyl           predniSONE 10 mg tablet  Commonly known as: Deltasone      Take 50 mg daily and then taper per direction of physician.       PRENATAL 19 ORAL                     Where to Get Your Medications        These medications were sent to [facility]/pharmacy #[id number] - [address] AT [place] [id number]      Phone: [phone]   lidocaine 4 % patch       You can get these medications from any pharmacy    Bring a paper prescription for each of these medications  oxyCODONE 5 mg immediate release tablet          Complications Requiring Follow-Up  Postpartum Hemorrhage, ITP    Test Results Pending At Discharge  Pending Labs       No current pending labs.            Outpatient Follow-Up  Future Appointments   Date Time Provider Department Center   8/6/2025  4:00 PM Daniel Gale MD [id number] spent 10 minutes in the professional and overall care of this patient.      Ranadll Dooley MD

## 2025-05-12 ENCOUNTER — HOSPITAL ENCOUNTER (OUTPATIENT)
Facility: HOSPITAL | Age: 30
Discharge: HOME | End: 2025-05-12
Attending: OBSTETRICS & GYNECOLOGY | Admitting: OBSTETRICS & GYNECOLOGY
Payer: COMMERCIAL

## 2025-05-12 VITALS
RESPIRATION RATE: 17 BRPM | HEIGHT: 65 IN | DIASTOLIC BLOOD PRESSURE: 76 MMHG | BODY MASS INDEX: 29.9 KG/M2 | HEART RATE: 97 BPM | WEIGHT: 179.45 LBS | OXYGEN SATURATION: 94 % | SYSTOLIC BLOOD PRESSURE: 138 MMHG | TEMPERATURE: 98.1 F

## 2025-05-12 PROBLEM — Z3A.39 39 WEEKS GESTATION OF PREGNANCY (HHS-HCC): Status: RESOLVED | Noted: 2025-04-23 | Resolved: 2025-05-12

## 2025-05-12 PROBLEM — O36.60X0: Status: RESOLVED | Noted: 2025-04-18 | Resolved: 2025-05-12

## 2025-05-12 LAB
ABO GROUP (TYPE) IN BLOOD: NORMAL
ALBUMIN SERPL BCP-MCNC: 3.1 G/DL (ref 3.4–5)
ALP SERPL-CCNC: 71 U/L (ref 33–110)
ALT SERPL W P-5'-P-CCNC: 29 U/L (ref 7–45)
ANION GAP SERPL CALC-SCNC: 12 MMOL/L (ref 10–20)
ANTIBODY SCREEN: NORMAL
AST SERPL W P-5'-P-CCNC: 28 U/L (ref 9–39)
BILIRUB SERPL-MCNC: 0.3 MG/DL (ref 0–1.2)
BUN SERPL-MCNC: 13 MG/DL (ref 6–23)
CALCIUM SERPL-MCNC: 8.4 MG/DL (ref 8.6–10.3)
CHLORIDE SERPL-SCNC: 103 MMOL/L (ref 98–107)
CO2 SERPL-SCNC: 26 MMOL/L (ref 21–32)
CREAT SERPL-MCNC: 0.36 MG/DL (ref 0.5–1.05)
EGFRCR SERPLBLD CKD-EPI 2021: >90 ML/MIN/1.73M*2
ERYTHROCYTE [DISTWIDTH] IN BLOOD BY AUTOMATED COUNT: 14 % (ref 11.5–14.5)
GLUCOSE SERPL-MCNC: 89 MG/DL (ref 74–99)
HCT VFR BLD AUTO: 29.2 % (ref 36–46)
HGB BLD-MCNC: 9.4 G/DL (ref 12–16)
MCH RBC QN AUTO: 28.1 PG (ref 26–34)
MCHC RBC AUTO-ENTMCNC: 32.2 G/DL (ref 32–36)
MCV RBC AUTO: 87 FL (ref 80–100)
NRBC BLD-RTO: 0 /100 WBCS (ref 0–0)
PLATELET # BLD AUTO: 148 X10*3/UL (ref 150–450)
POTASSIUM SERPL-SCNC: 3.7 MMOL/L (ref 3.5–5.3)
PROT SERPL-MCNC: 5.5 G/DL (ref 6.4–8.2)
RBC # BLD AUTO: 3.34 X10*6/UL (ref 4–5.2)
RH FACTOR (ANTIGEN D): NORMAL
SODIUM SERPL-SCNC: 137 MMOL/L (ref 136–145)
WBC # BLD AUTO: 13.2 X10*3/UL (ref 4.4–11.3)

## 2025-05-12 PROCEDURE — 36415 COLL VENOUS BLD VENIPUNCTURE: CPT | Performed by: OBSTETRICS & GYNECOLOGY

## 2025-05-12 PROCEDURE — 86901 BLOOD TYPING SEROLOGIC RH(D): CPT | Performed by: OBSTETRICS & GYNECOLOGY

## 2025-05-12 PROCEDURE — 99213 OFFICE O/P EST LOW 20 MIN: CPT

## 2025-05-12 PROCEDURE — 80053 COMPREHEN METABOLIC PANEL: CPT | Performed by: OBSTETRICS & GYNECOLOGY

## 2025-05-12 PROCEDURE — 85027 COMPLETE CBC AUTOMATED: CPT | Performed by: OBSTETRICS & GYNECOLOGY

## 2025-05-12 PROCEDURE — 99215 OFFICE O/P EST HI 40 MIN: CPT | Performed by: OBSTETRICS & GYNECOLOGY

## 2025-05-12 RX ORDER — HYDRALAZINE HYDROCHLORIDE 20 MG/ML
5 INJECTION INTRAMUSCULAR; INTRAVENOUS ONCE AS NEEDED
Status: DISCONTINUED | OUTPATIENT
Start: 2025-05-12 | End: 2025-05-12 | Stop reason: HOSPADM

## 2025-05-12 RX ORDER — LABETALOL HYDROCHLORIDE 5 MG/ML
20 INJECTION, SOLUTION INTRAVENOUS ONCE AS NEEDED
Status: DISCONTINUED | OUTPATIENT
Start: 2025-05-12 | End: 2025-05-12 | Stop reason: HOSPADM

## 2025-05-12 RX ORDER — LIDOCAINE HYDROCHLORIDE 10 MG/ML
0.5 INJECTION, SOLUTION EPIDURAL; INFILTRATION; INTRACAUDAL; PERINEURAL ONCE AS NEEDED
Status: DISCONTINUED | OUTPATIENT
Start: 2025-05-12 | End: 2025-05-12 | Stop reason: HOSPADM

## 2025-05-12 RX ORDER — ONDANSETRON HYDROCHLORIDE 2 MG/ML
4 INJECTION, SOLUTION INTRAVENOUS EVERY 6 HOURS PRN
Status: DISCONTINUED | OUTPATIENT
Start: 2025-05-12 | End: 2025-05-12 | Stop reason: HOSPADM

## 2025-05-12 RX ORDER — ONDANSETRON 4 MG/1
4 TABLET, FILM COATED ORAL EVERY 6 HOURS PRN
Status: DISCONTINUED | OUTPATIENT
Start: 2025-05-12 | End: 2025-05-12 | Stop reason: HOSPADM

## 2025-05-12 SDOH — SOCIAL STABILITY: SOCIAL INSECURITY: DOES ANYONE TRY TO KEEP YOU FROM HAVING/CONTACTING OTHER FRIENDS OR DOING THINGS OUTSIDE YOUR HOME?: NO

## 2025-05-12 SDOH — SOCIAL STABILITY: SOCIAL INSECURITY: ARE THERE ANY APPARENT SIGNS OF INJURIES/BEHAVIORS THAT COULD BE RELATED TO ABUSE/NEGLECT?: NO

## 2025-05-12 SDOH — SOCIAL STABILITY: SOCIAL INSECURITY: DO YOU FEEL ANYONE HAS EXPLOITED OR TAKEN ADVANTAGE OF YOU FINANCIALLY OR OF YOUR PERSONAL PROPERTY?: NO

## 2025-05-12 SDOH — SOCIAL STABILITY: SOCIAL INSECURITY: DO YOU FEEL UNSAFE GOING BACK TO THE PLACE WHERE YOU ARE LIVING?: NO

## 2025-05-12 SDOH — SOCIAL STABILITY: SOCIAL INSECURITY: ARE YOU OR HAVE YOU BEEN THREATENED OR ABUSED PHYSICALLY, EMOTIONALLY, OR SEXUALLY BY ANYONE?: NO

## 2025-05-12 SDOH — SOCIAL STABILITY: SOCIAL INSECURITY: ABUSE: ADULT

## 2025-05-12 SDOH — SOCIAL STABILITY: SOCIAL INSECURITY: HAVE YOU HAD THOUGHTS OF HARMING ANYONE ELSE?: NO

## 2025-05-12 SDOH — SOCIAL STABILITY: SOCIAL INSECURITY: HAS ANYONE EVER THREATENED TO HURT YOUR FAMILY OR YOUR PETS?: NO

## 2025-05-12 SDOH — SOCIAL STABILITY: SOCIAL INSECURITY: HAVE YOU HAD ANY THOUGHTS OF HARMING ANYONE ELSE?: NO

## 2025-05-12 SDOH — SOCIAL STABILITY: SOCIAL INSECURITY: WERE YOU ABLE TO COMPLETE ALL THE BEHAVIORAL HEALTH SCREENINGS?: YES

## 2025-05-12 ASSESSMENT — COLUMBIA-SUICIDE SEVERITY RATING SCALE - C-SSRS
2. HAVE YOU ACTUALLY HAD ANY THOUGHTS OF KILLING YOURSELF?: NO
6. HAVE YOU EVER DONE ANYTHING, STARTED TO DO ANYTHING, OR PREPARED TO DO ANYTHING TO END YOUR LIFE?: NO
1. IN THE PAST MONTH, HAVE YOU WISHED YOU WERE DEAD OR WISHED YOU COULD GO TO SLEEP AND NOT WAKE UP?: NO

## 2025-05-12 ASSESSMENT — PATIENT HEALTH QUESTIONNAIRE - PHQ9
SUM OF ALL RESPONSES TO PHQ9 QUESTIONS 1 & 2: 0
2. FEELING DOWN, DEPRESSED OR HOPELESS: NOT AT ALL
1. LITTLE INTEREST OR PLEASURE IN DOING THINGS: NOT AT ALL

## 2025-05-12 ASSESSMENT — LIFESTYLE VARIABLES
SKIP TO QUESTIONS 9-10: 1
HOW MANY STANDARD DRINKS CONTAINING ALCOHOL DO YOU HAVE ON A TYPICAL DAY: PATIENT DOES NOT DRINK
HOW OFTEN DO YOU HAVE 6 OR MORE DRINKS ON ONE OCCASION: NEVER
AUDIT-C TOTAL SCORE: 0
AUDIT-C TOTAL SCORE: 0
HOW OFTEN DO YOU HAVE A DRINK CONTAINING ALCOHOL: NEVER

## 2025-05-12 NOTE — NURSING NOTE
Patient educated on blood pressure log, s/s of increasing blood pressure, how to take blood pressure, and when to call provider. Patient has follow up appointment on Friday 5/16. Patient verbalizes understanding and states that she will call if any symptoms, increased blood pressure, and that she will go to appointment. No questions at this time.

## 2025-05-12 NOTE — H&P
OB Triage H&P    Assessment/Plan    Melissa German is a 30 y.o.  at POD # 4 who presents to triage with high BP at home 160/66       Plan    Dx of GHTN   /89, 146 / 84 here     Hb = 9.4 ( was 9.7)   Plts 148 K ( was 146   Liver enz wnl          -Patient appropriate for discharge home, agrees with plan to monitor BP 2 x a day has a cuff   Keep office appt in 1 week   -Return precautions discussed   -Follow up at next scheduled OB appointment or to triage sooner as needed  Lorraine Pastor MD           Subjective     Melissa German is a 30 y.o.  at POD # 4 who presents to triage with high BP at home 160/66   Had Rcsec  / postpartum hemorrhage/ ITP  w blood transfusion . Hb = 9.7   No dx of HTN disorders   Prenatal Provider Dr Churchill     OB History    Para Term  AB Living   3 2 2 0 0 2   SAB IAB Ectopic Multiple Live Births   0 0 0 0 2      # Outcome Date GA Lbr Adolfo/2nd Weight Sex Type Anes PTL Lv   3             2 Term 25 39w0d  4.45 kg M CS-LVertical Spinal  SVITLANA      Name: Sea German      Apgar1: 9  Apgar5: 10   1 Term 23 39w0d  3.81 kg F CS-LTranv Spinal N SVITLANA      Name: Alley       Surgical History[1]    Social History     Tobacco Use    Smoking status: Never    Smokeless tobacco: Never   Substance Use Topics    Alcohol use: Never       Allergies[2]    Prescriptions Prior to Admission[3]  Objective     Last Vitals  Temp Pulse Resp BP MAP O2 Sat   36.7 °C (98.1 °F) 103 17 (!) 146/84 107 97 %     Blood Pressures         2025  1137 2025  1152          BP: 144/89 146/84               Physical Exam  General: NAD, mood appropriate  Cardiopulmonary: warm and well perfused, breathing comfortably on room air  Abdomen: Gravid, non-tender. Incision dry and intact   Extremities: Symmetric  Cervix:   closed / no clots, minimal vag bleeding       Chaperone Present: Yes.  Chaperone Name/Title: RN   Examination Chaperoned: Gynecological Exam          Labs in chart were reviewed.  CBC   Recent Labs     25  1159 25  0620   WBC 13.2* 23.3*   HGB 9.4* 9.7*   HCT 29.2* 29.8*   * 146*     CMP       Results from last 7 days   Lab Units 25  0620 25  1734 25  1110 25  0727 25  0657 25  1350   WBC AUTO x10*3/uL 23.3* 21.8* 14.1*  --    < >  --    HEMOGLOBIN g/dL 9.7* 11.8* 11.9*  --    < >  --    HEMATOCRIT % 29.8* 36.0 37.3  --    < >  --    PLATELETS AUTO x10*3/uL 146* 147* 105*  --    < >  --    AST U/L  --   --   --  12  --  12   ALT U/L  --   --   --  9  --  8   CREATININE mg/dL  --   --   --  0.41*  --  0.39*    < > = values in this interval not displayed.                 [1]   Past Surgical History:  Procedure Laterality Date     SECTION, LOW TRANSVERSE      WISDOM TOOTH EXTRACTION Bilateral    [2] No Known Allergies  [3]   Medications Prior to Admission   Medication Sig Dispense Refill Last Dose/Taking    levothyroxine (Synthroid, Levoxyl) 50 mcg tablet Take 1 tablet (50 mcg) by mouth once daily.   2025    predniSONE (Deltasone) 10 mg tablet Take 50 mg daily and then taper per direction of physician. 100 tablet 1 2025    lidocaine 4 % patch Place 1 patch over 12 hours on the skin every 24 (twenty four) hours if needed for mild pain (1 - 3). Remove & discard patch within 12 hours or as directed by MD. 3 patch 0     oxyCODONE (Roxicodone) 5 mg immediate release tablet Take 1 tablet (5 mg) by mouth every 4 hours if needed (Pain score 6-8). 15 tablet 0     prenatal no115/iron/folic acid (PRENATAL 19 ORAL) Take by mouth.

## 2025-05-16 ENCOUNTER — POSTPARTUM VISIT (OUTPATIENT)
Dept: OBSTETRICS AND GYNECOLOGY | Facility: CLINIC | Age: 30
End: 2025-05-16
Payer: COMMERCIAL

## 2025-05-16 VITALS — DIASTOLIC BLOOD PRESSURE: 75 MMHG | WEIGHT: 171 LBS | SYSTOLIC BLOOD PRESSURE: 115 MMHG | BODY MASS INDEX: 28.46 KG/M2

## 2025-05-16 DIAGNOSIS — O13.9 GESTATIONAL HYPERTENSION, ANTEPARTUM (HHS-HCC): ICD-10-CM

## 2025-05-16 DIAGNOSIS — Z98.891 S/P CESAREAN SECTION: Primary | ICD-10-CM

## 2025-05-16 ASSESSMENT — EDINBURGH POSTNATAL DEPRESSION SCALE (EPDS)
I HAVE LOOKED FORWARD WITH ENJOYMENT TO THINGS: AS MUCH AS I EVER DID
I HAVE BEEN SO UNHAPPY THAT I HAVE BEEN CRYING: ONLY OCCASIONALLY
I HAVE BEEN ANXIOUS OR WORRIED FOR NO GOOD REASON: HARDLY EVER
I HAVE BEEN ABLE TO LAUGH AND SEE THE FUNNY SIDE OF THINGS: AS MUCH AS I ALWAYS COULD
I HAVE BLAMED MYSELF UNNECESSARILY WHEN THINGS WENT WRONG: NOT VERY OFTEN
THE THOUGHT OF HARMING MYSELF HAS OCCURRED TO ME: NEVER
I HAVE BEEN SO UNHAPPY THAT I HAVE HAD DIFFICULTY SLEEPING: NOT AT ALL
I HAVE FELT SCARED OR PANICKY FOR NO GOOD REASON: NO, NOT MUCH
THINGS HAVE BEEN GETTING ON TOP OF ME: YES, SOMETIMES I HAVEN'T BEEN COPING AS WELL AS USUAL
TOTAL SCORE: 7
I HAVE FELT SAD OR MISERABLE: NOT VERY OFTEN

## 2025-05-16 NOTE — PROGRESS NOTES
1 week PPV/BP check  C/S on   C/O: Pain in urethra when bladder is full.  BP's at home have been normal.    Postpartum Visit    HPI:  Pt presents for her incision check  and blood pressure check  s/p repeat  section on  complicated by post-partum hemorrhage.  She ultimately received 1 unit PRBCs as well as 1 pack of platelets.  She had a baby boy.  She is Breast feeding. Overall, she is doing well.  She is no longer taking any pain medications regularly. She occasionally still takes a tylenol.     She was seen on L&D postpartum due to elevated Bps at home.  Her Bps have since normalized.      She does have mild deep pain when her bladder is full.  It goes away once she empties her bladder and does not burn while she is urinating.  She says it does not feel like a UTI.     ROS:  Denies the following: Complains of the following:    - episiotomy site pain   - incisional pain  - incisional drainage  - heavy bleeding  - breast pain  - cracked nipples  - breastfeeding problems  - abdominal pain  - vaginal discharge  - shortness of breath  - chest pain  - back pain  - leg pain  - depression  - suicidal ideation  - nausea  - vomiting  - fever  - pain with urination  - headache - irregular bleeding  - tiredness or fatigue       O:  /75   Wt 77.6 kg (171 lb)   LMP 2024 (Exact Date)   Breastfeeding Yes   BMI 28.46 kg/m²     General Appearance   - consistent with stated age, well groomed and cooperative    Integumentary  - skin warm and dry without rash    Head and Neck  - normalocephalic and neck supple    Chest and Lung Exam  - normal breathing effort, no respiratory distress    Abdomen  - soft, nontender and no hepatomegaly, splenomegaly, or mass; well-healing Pfannenstiel skin incision without drainage or erythema     Peripheral Vascular  - no edema present    A/P:   Pt is a 30 y.o.  who presents for incision check  and blood pressure check .  Doing well overall postpartum.  Coping well  with new baby at home.  [place]  Depression Scale Total: 7.  Breastfeeding going well.  Baby doing well.  Bps WNL.  Incision healing well.  F/u at 6 weeks or sooner as needed.

## 2025-05-21 ENCOUNTER — PATIENT OUTREACH (OUTPATIENT)
Dept: CARE COORDINATION | Facility: CLINIC | Age: 30
End: 2025-05-21
Payer: COMMERCIAL

## 2025-06-18 ENCOUNTER — APPOINTMENT (OUTPATIENT)
Dept: OBSTETRICS AND GYNECOLOGY | Facility: CLINIC | Age: 30
End: 2025-06-18
Payer: COMMERCIAL

## 2025-06-18 VITALS
HEIGHT: 65 IN | HEART RATE: 66 BPM | OXYGEN SATURATION: 99 % | SYSTOLIC BLOOD PRESSURE: 128 MMHG | DIASTOLIC BLOOD PRESSURE: 82 MMHG | WEIGHT: 167 LBS | BODY MASS INDEX: 27.82 KG/M2

## 2025-06-18 DIAGNOSIS — Z98.891 S/P CESAREAN SECTION: Primary | ICD-10-CM

## 2025-06-18 ASSESSMENT — EDINBURGH POSTNATAL DEPRESSION SCALE (EPDS)
THINGS HAVE BEEN GETTING ON TOP OF ME: NO, MOST OF THE TIME I HAVE COPED QUITE WELL
I HAVE BEEN SO UNHAPPY THAT I HAVE BEEN CRYING: ONLY OCCASIONALLY
I HAVE LOOKED FORWARD WITH ENJOYMENT TO THINGS: AS MUCH AS I EVER DID
I HAVE BEEN ANXIOUS OR WORRIED FOR NO GOOD REASON: HARDLY EVER
THE THOUGHT OF HARMING MYSELF HAS OCCURRED TO ME: NEVER
I HAVE BEEN ABLE TO LAUGH AND SEE THE FUNNY SIDE OF THINGS: AS MUCH AS I ALWAYS COULD
I HAVE BLAMED MYSELF UNNECESSARILY WHEN THINGS WENT WRONG: NOT VERY OFTEN
I HAVE FELT SAD OR MISERABLE: NOT VERY OFTEN
TOTAL SCORE: 6
I HAVE BEEN SO UNHAPPY THAT I HAVE HAD DIFFICULTY SLEEPING: NOT VERY OFTEN
I HAVE FELT SCARED OR PANICKY FOR NO GOOD REASON: NO, NOT AT ALL

## 2025-06-18 NOTE — PROGRESS NOTES
"Patient presents for a 6 week postpartum visit   Delivered by  on 2025  Last PAP 4/10/2024 NEG    Shannon Vásquez ROMAIN    Postpartum Visit    HPI:  Pt presents for her 6 week postpartum visit s/p repeat  section on  complicated by post-partum hemorrhage.  She ultimately received 1 unit PRBCs as well as 1 pack of platelets.  She had a baby boy.  She is Breast feeding. Overall, she is doing well. She occasionally feels mildly sore near her incision but overall is not having pain.     Her vaginal bleeding stopped postpartum for about a week.  It then started again a few days ago and she is still having light to moderate bleeding.     ROS:  Denies the following: Complains of the following:    - episiotomy site pain   - incisional pain  - incisional drainage  - heavy bleeding  - breast pain  - cracked nipples  - breastfeeding problems  - abdominal pain  - vaginal discharge  - shortness of breath  - chest pain  - back pain  - leg pain  - depression  - suicidal ideation  - nausea  - vomiting  - fever  - pain with urination  - headache  - irregular bleeding  - tiredness or fatigue        O:  /82 (BP Location: Left arm, Patient Position: Sitting, BP Cuff Size: Adult)   Pulse 66   Ht 1.651 m (5' 5\")   Wt 75.8 kg (167 lb)   SpO2 99%   Breastfeeding Yes   BMI 27.79 kg/m²     General Appearance   - consistent with stated age, well groomed and cooperative    Integumentary  - skin warm and dry without rash    Head and Neck  - normalocephalic and neck supple    Chest and Lung Exam  - normal breathing effort, no respiratory distress    Abdomen  - soft, nontender, non-distended, well-healed Pfannenstiel skin incision - very small pinpoint opening at the left edge of the incision (pt states this just happened today and was unaware of this) - does not track deeply or laterally and no surrounding erythema     Peripheral Vascular  - no edema present    A/P:   Pt is a 30 y.o.  who presents for 6 " week postpartum visit.  Doing well overall postpartum.  Coping well with new baby at home.  [place]  Depression Scale Total: 6.  Breastfeeding going well.  Baby doing well.  Bps WNL.  Incision healing well overall.  Pinpoint opening with no sign of infection.  Expectant management recommended. Discussed warning signs/when to call.  Discussed contraception - declines.  Plans to use barriers/NFP. Pap UTD and not indicated. F/u for annual exam or as needed.

## (undated) DEVICE — SUTURE, VICRYL, 2-0, 27 IN, CT-1, VIOLET

## (undated) DEVICE — SUTURE, VICRYL, 0, 36 IN, CT-1, UNDYED

## (undated) DEVICE — Device

## (undated) DEVICE — PAD, GROUNDING, ELECTROSURGICAL, W/9 FT CABLE, POLYHESIVE II, ADULT, LF

## (undated) DEVICE — SOLUTION, IRRIGATION, SODIUM CHLORIDE 0.9%, 1000 ML, POUR BOTTLE

## (undated) DEVICE — TRAY, SURESTEP, SILICONE DRAINAGE BAG, STATLOCK, 16FR

## (undated) DEVICE — APPLICATOR, CHLORAPREP, W/ORANGE TINT, 26ML

## (undated) DEVICE — GLOVE, SURGICAL, PROTEXIS PI , 7.5, PF, LF

## (undated) DEVICE — ADHESIVE, SKIN, DERMABOND ADVANCED, 15CM, PEN-STYLE

## (undated) DEVICE — TRAY, SPINAL, PENCAN 25 GA X 3.5"

## (undated) DEVICE — CAP, BABY, 4 X 6 IN, PINK/BLUE/WHITE

## (undated) DEVICE — SLEEVE, SCD EXPRESS, KNEE LENGTH-MEDIUM

## (undated) DEVICE — PREP TRAY, VAGINAL

## (undated) DEVICE — TUBING, SUCTION, CONNECTING, 9/32 X 10FT, LF

## (undated) DEVICE — SUTURE, MONOCRYL, 0, 36 IN, CT, VIOLET

## (undated) DEVICE — SUTURE, VICRYL, 3-0, 27 IN, CT-1, UNDYED

## (undated) DEVICE — COVER HANDLE LIGHT, STERIS, BLUE, STERILE

## (undated) DEVICE — SUTURE, VICRYL, 4-0, 18 IN, UNDYED BR PS-2

## (undated) DEVICE — SOLUTION, IRRIGATION, STERILE WATER, 1000 ML, POUR BOTTLE

## (undated) DEVICE — GLOVE, SURGICAL, PROTEXIS PI , 6.5, PF, LF